# Patient Record
Sex: MALE | Race: WHITE | NOT HISPANIC OR LATINO | Employment: FULL TIME | URBAN - METROPOLITAN AREA
[De-identification: names, ages, dates, MRNs, and addresses within clinical notes are randomized per-mention and may not be internally consistent; named-entity substitution may affect disease eponyms.]

---

## 2019-05-24 ENCOUNTER — TRANSFERRED RECORDS (OUTPATIENT)
Dept: HEALTH INFORMATION MANAGEMENT | Facility: CLINIC | Age: 69
End: 2019-05-24

## 2019-05-31 ENCOUNTER — TRANSFERRED RECORDS (OUTPATIENT)
Dept: HEALTH INFORMATION MANAGEMENT | Facility: CLINIC | Age: 69
End: 2019-05-31

## 2019-07-18 ENCOUNTER — TRANSFERRED RECORDS (OUTPATIENT)
Dept: HEALTH INFORMATION MANAGEMENT | Facility: CLINIC | Age: 69
End: 2019-07-18

## 2019-07-29 ENCOUNTER — TRANSFERRED RECORDS (OUTPATIENT)
Dept: HEALTH INFORMATION MANAGEMENT | Facility: CLINIC | Age: 69
End: 2019-07-29

## 2019-08-26 ENCOUNTER — TRANSFERRED RECORDS (OUTPATIENT)
Dept: HEALTH INFORMATION MANAGEMENT | Facility: CLINIC | Age: 69
End: 2019-08-26

## 2019-10-02 NOTE — TELEPHONE ENCOUNTER
RECORDS STATUS - ALL OTHER DIAGNOSIS      RECORDS RECEIVED FROM: Epic/[facility]   DATE RECEIVED: 10/9/2019    NOTES STATUS DETAILS   OFFICE NOTE from referring provider     OFFICE NOTE from medical oncologist [facility]- Dr. Shruthi Santiago- Dr. Meeks   DISCHARGE SUMMARY from hospital     DISCHARGE REPORT from the ER     OPERATIVE REPORT     MEDICATION LIST     CLINICAL TRIAL TREATMENTS TO DATE     LABS     PATHOLOGY REPORTS [facility] bx slides arrived on 10/18/2019 at 11:15am and sent up to the 5th floor path lab at the [facility].  Reports are being faxed over from [facility]. Faxed will request slides from Dr. Pacheco.    ANYTHING RELATED TO DIAGNOSIS     GENONOMIC TESTING     TYPE:     IMAGING (NEED IMAGES & REPORT)     CT SCANS     MRI Complete- Resolved IMG on 10/2/2019 at 1:12pm from [facility].  Reports are being faxed from [facility]. IMG was done at [facility].     PACS   MAMMO     ULTRASOUND     PET       Action    Action Taken 10/2/2019 10:37am     Correction for appt : Pt has been seen by Dr. Meeks. The pt hasn't been to a [facility] facility.     I called the pt and he mentioned having a biopsy and MRI IMG done at [facility].     I called [facility] ph: [phone]. [facility] records in [place].     Dr. Hawkins is a Urologist at [facility]. [facility] is going to fax over records including path reports and IMG reports.     IMG was done at [facility].     Dr. Pacheco manages the pathology dept at [facility]. He requires an ZAC for the path slides. Fax path request to: [phone] Mailing Address: [address] [place] [address].     I called pt Carmelo back to request and ZAC for the path slides. He's going to fax the ZAC back today. His email address is: [email or web address]   I sent an email to pt Carmelo requesting a signed ZAC.     I sent an Ib message to Dr. Faith and his nurse with the records and path updates.     I called [facility] to request all IMG to be  pushed to PACS. [facility] [place], [place]  [phone]    1:08pm   I called pt Carmelo back to follow up on ZAC. He just arrived at his office and faxed the form over while on the phone. I will check for the ZAC in a few mins and send a request off to [facility] path.     Resolved MRI IMG in PACS.     2:35pm   Sent a faxed request, shipping label and ZAC to [facility] for path slides.     10/3/2019 11am   Called [facility]  [phone] to make sure the fax went through and that the path request was sent to the path department.     I left a vm for the MR Dept at [facility] requesting a call back.

## 2019-10-09 ENCOUNTER — ONCOLOGY VISIT (OUTPATIENT)
Dept: ONCOLOGY | Facility: CLINIC | Age: 69
End: 2019-10-09
Attending: UROLOGY
Payer: COMMERCIAL

## 2019-10-09 ENCOUNTER — PRE VISIT (OUTPATIENT)
Dept: ONCOLOGY | Facility: CLINIC | Age: 69
End: 2019-10-09

## 2019-10-09 VITALS
WEIGHT: 220 LBS | RESPIRATION RATE: 16 BRPM | DIASTOLIC BLOOD PRESSURE: 91 MMHG | OXYGEN SATURATION: 95 % | HEIGHT: 72 IN | BODY MASS INDEX: 29.8 KG/M2 | TEMPERATURE: 97 F | HEART RATE: 69 BPM | SYSTOLIC BLOOD PRESSURE: 146 MMHG

## 2019-10-09 DIAGNOSIS — C61 PROSTATE CANCER (H): Primary | ICD-10-CM

## 2019-10-09 PROBLEM — K21.9 HIATAL HERNIA WITH GERD: Status: ACTIVE | Noted: 2018-11-14

## 2019-10-09 PROBLEM — E78.00 PURE HYPERCHOLESTEROLEMIA: Status: ACTIVE | Noted: 2019-10-09

## 2019-10-09 PROBLEM — K44.9 HIATAL HERNIA WITH GERD: Status: ACTIVE | Noted: 2018-11-14

## 2019-10-09 PROCEDURE — 99204 OFFICE O/P NEW MOD 45 MIN: CPT | Performed by: UROLOGY

## 2019-10-09 PROCEDURE — G0463 HOSPITAL OUTPT CLINIC VISIT: HCPCS

## 2019-10-09 RX ORDER — OMEPRAZOLE 40 MG/1
40 CAPSULE, DELAYED RELEASE ORAL DAILY
COMMUNITY

## 2019-10-09 RX ORDER — LISINOPRIL 10 MG/1
10 TABLET ORAL DAILY
COMMUNITY

## 2019-10-09 ASSESSMENT — MIFFLIN-ST. JEOR: SCORE: 1787.97

## 2019-10-09 ASSESSMENT — PAIN SCALES - GENERAL: PAINLEVEL: NO PAIN (0)

## 2019-10-09 NOTE — PROGRESS NOTES
Chief Complaint:   Prostate Cancer         History of Present Illness:    Ghassan Lambert is a very pleasant 69 year old male who presents with a history of prostate cancer. He presented to Dr. Hawkins earlier this year with elevated PSA to 4.70, 16% free PSA. Normal exam per report. States his father had some sort of prostate surgery, unclear if cancer. MRI done and notable for PIRADS 3 lesion. Uronav biopsy completed 7/18/2019. Found to have 3 cores of [place] 3+3 prostate cancer <15% of cores from the right prostate. He has discussed this pathology with Dr. Hawkins and initially leaning toward surveillance. He presents today for a second opinion.     He otherwise reports no significant urinary symptoms. States erections are normal.         Past Medical History:   Hiatal hernia  HTN         Past Surgical History:   Alana  Robotic hernia repair  Deviated septum repair         Medications     Current Outpatient Medications   Medication     lisinopril (PRINIVIL/ZESTRIL) 10 MG tablet     omeprazole (PRILOSEC) 40 MG DR capsule     No current facility-administered medications for this visit.           Family History:   Father with prostatectomy         Social History:     Social History     Socioeconomic History     Marital status:      Spouse name: Not on file     Number of children: Not on file     Years of education: Not on file     Highest education level: Not on file   Occupational History     Not on file   Social Needs     Financial resource strain: Not on file     Food insecurity:     Worry: Not on file     Inability: Not on file     Transportation needs:     Medical: Not on file     Non-medical: Not on file   Tobacco Use     Smoking status: Former Smoker     Packs/day: 0.00     Smokeless tobacco: Never Used   Substance and Sexual Activity     Alcohol use: Not on file     Comment: Rarely     Drug use: Never     Sexual activity: Not on file   Lifestyle     Physical activity:     Days per week: Not on  "file     Minutes per session: Not on file     Stress: Not on file   Relationships     Social connections:     Talks on phone: Not on file     Gets together: Not on file     Attends Worship service: Not on file     Active member of club or organization: Not on file     Attends meetings of clubs or organizations: Not on file     Relationship status: Not on file     Intimate partner violence:     Fear of current or ex partner: Not on file     Emotionally abused: Not on file     Physically abused: Not on file     Forced sexual activity: Not on file   Other Topics Concern     Parent/sibling w/ CABG, MI or angioplasty before 65F 55M? Not Asked   Social History Narrative     Not on file     No current tobacco use  Still working - runs company         Allergies:   Ezetimibe and Pravastatin         Review of Systems:  From intake questionnaire     Skin: negative  Eyes: negative  Ears/Nose/Throat: negative  Respiratory: No shortness of breath, dyspnea on exertion, cough, or hemoptysis  Cardiovascular: No chest pain or palpitations  Gastrointestinal: negative; no nausea/vomiting, constipation or diarrhea  Genitourinary: as per HPI  Musculoskeletal: negative  Neurologic: negative  Psychiatric: negative  Hematologic/Lymphatic/Immunologic: negative  Endocrine: negative         Physical Exam:     Patient is a 69 year old  male   Vitals: Blood pressure (!) 146/91, pulse 69, temperature 97  F (36.1  C), temperature source Tympanic, resp. rate 16, height 1.816 m (5' 11.5\"), weight 99.8 kg (220 lb), SpO2 95 %.  Constitutional: Body mass index is 30.26 kg/m .  Alert, no acute distress, oriented, conversant  Eyes: no scleral icterus; extraocular muscles intact, moist conjunctivae  Neck: trachea midline, no thyromegaly  Ears/nose/mouth: throat/mouth:normal, good dentition  Respiratory: no respiratory distress, or pursed lip breathing  Cardiovascular: pulses strong and intact; no obvious jugular venous distension " present  Gastrointestinal: soft, nontender, no organomegaly or masses,   Lymphatics: No inguinal adenopathy  Musculoskeletal: extremities normal, no peripheral edema  Skin: no suspicious lesions or rashes  Neuro: Alert, oriented, speech and mentation normal  Psych: affect and mood normal, alert and oriented to person, place and time  Gait: Normal  : deferred      Imaging:    I reviewed all applicable imaging and went over findings with patient.    MRI Prostate 5/31/2019  PIRADS 3 lesion at right mid gland peripheral zone      Outside and Past Medical records:    I spent 10 minutes reviewing outside and past medical records.         Assessment and Plan:     Assessment: 69 year old male with Dile[place] 3+3 prostate cancer, PSA 4.7, PIRADS 3 lesion. We had an extensive discussion about the significance of localized, prostate cancer. We discussed the options for treatment of a localized prostate cancer including active surveillance, brachytherapy, external beam radiation therapy, and surgical removal.      We discussed the relative merits of robotic assisted radical prostatectomy. We also discussed the advantages and disadvantages and roles of open surgery vs. laparoscopic (and Da Oz assisted) surgery. The anticipated post-operative course was explained, including an anticipated 1-2 day hospital stay. Catheter will remain in place 10-14 days.  The risks, benefits, alternatives, and personnel involved in the procedure were discussed. Specifically, we discussed that risks include but are not limited to anesthetic complications including stroke, MI, DVT/PE, as well as risk of bleeding, bowel injury, infection, lymphocele, urine leak and other potentially unforseen complications. Also discussed the risk of bladder neck contracture and other urinary symptoms after procedure. In addition, we discussed risk of urinary incontinence and erectile dysfunction following the procedure. Finally, we discussed risk for adverse  pathologic features, including positive surgical margins and potential for post-surgical radiation, hormone ablation and long-term need for PSA monitoring,.  All questions were answered in detail.     We discussed that there was no clear evidence of advantage between surgery and radiation with regard to risk of recurrence. We discussed option for discussion with radiation oncology, but patient would prefer to defer this at this time.    The majority of our discussion related to active surveillance for prostate cancer. We discussed that this is a common treatment decision for men with very low and low risk prostate cancer and is endorsed by major urologic guidelines. We discussed the overall slow growth of most prostate cancers and the long history of data suggesting the safety of this approach. We discussed the ProtecT trial and that this randomized study demonstrated no differences in survival for men comparing active surveillance to radiation or surgery. We discussed that surveillance typically involves PSA rechecks every 6 months with intermittent repeat MRIs and repeat biopsies. We also discussed that approximately half of men eventually pursue active treatment while on surveillance.    As an adjunct to this, we discussed the role of genomic tests in low-risk prostate cancer as an additional tool to stratify risk. He is interested in this and would like to proceed with an Oncotype.    At this point, he would like to proceed with surveillance. I will arrange for a second opinion on path slides. Will plan to send Oncotype once we receive these. Barring unexpected findings, will plan to have him follow-up in 6 months for PSA and ROSALVA    Plan:  Obtain Path slides  Oncotype Dx  Follow-up 6 months for PSA and ROSALVA    Orders  Orders Placed This Encounter   Procedures     Consult outside slides: Laboratory Miscellaneous Order     Tim Faith MD  Urology  [facility]  Clinic Phone  [phone]

## 2019-10-09 NOTE — NURSING NOTE
"Oncology Rooming Note    October 9, 2019 3:20 PM   Ghassan Lambert is a 69 year old male who presents for:    Chief Complaint   Patient presents with     Oncology Clinic Visit     New Patient     Initial Vitals: BP (!) 146/91   Pulse 69   Temp 97  F (36.1  C) (Tympanic)   Resp 16   Ht 1.816 m (5' 11.5\")   Wt 99.8 kg (220 lb)   SpO2 95%   BMI 30.26 kg/m   Estimated body mass index is 30.26 kg/m  as calculated from the following:    Height as of this encounter: 1.816 m (5' 11.5\").    Weight as of this encounter: 99.8 kg (220 lb). Body surface area is 2.24 meters squared.  No Pain (0) Comment: Data Unavailable   No LMP for male patient.  Allergies reviewed: Yes  Medications reviewed: Yes    Medications: Medication refills not needed today.  Pharmacy name entered into [employer]: [id number] [place], [place] - [address] [address]    Clinical concerns: Follow Up       Valerie Bermudez CMA              "

## 2019-10-10 NOTE — PROGRESS NOTES
Contacted [facility] regarding consult for outside slides.   Filled out Surgical Pathology-Outside Case requisition form.   Carmelita directed this RNCC to contact [facility] to send slides to [facility] [place].   Writer left message with [facility] transferred to Medical Records with mutual Pt identifiers request to send slides to [facility] [place].   Pending return call.    LUCITA GordonN, RN, OCN  RN Cancer Care Coordinator  [facility]  [phone]

## 2019-10-11 DIAGNOSIS — C61 PROSTATE CANCER (H): Primary | ICD-10-CM

## 2019-10-11 NOTE — PROGRESS NOTES
S-(situation): Slides requested for consult at [facility] pathology on 10/02/2019. Second request 10/10/2019. Confirmed 10/11/2019 slides sent via [employer] per Medical Records at [facility] to the [facility].     B-(background): Prostate Cancer     A-(assessment): Plan for pathology consultation on slides. Dr. aFith requests an Oncotype Dx.    R-(recommendations): This RNCC will complete paperwork and fax for consultation and Oncotype Dx.    Elyssa Wagner, BSN, RN, OCN  RN Cancer Care Coordinator  [facility]  [phone]

## 2019-10-16 ENCOUNTER — PATIENT OUTREACH (OUTPATIENT)
Dept: ONCOLOGY | Facility: CLINIC | Age: 69
End: 2019-10-16

## 2019-10-16 NOTE — PROGRESS NOTES
S-(situation): Contacted [facility] for 3rd request for biopsy tissue material to be sent to [facility] for consult. Transferred to Evelin whom called the Pathologist directly to inquire about pathology slides. Evelin's direct contact is [phone].    B-(background): Prostate Cancer    A-(assessment): [facility] states slides are usually sent overnight. Evelin is looking into [employer] number to determine location of slides. No documentation noted that slides were sent out.     Returned call to Evelin at [facility], left detailed message with contact information to please send slides to:  Pathology [address] [id number] [address]  [place] [address].     R-(recommendations): Will follow-up on 10/18/2019. Colleague Citlaly Talley RN contacted [facility] Pathology confirming receipt of biopsy tissue. Consult in process.    Request placed for Oncotype DX on /10/24/2019. Electronic requisition sent by Sara Pyle.     This RNCC will monitor chart and communicate with Dr. Faith when results are final.    Elyssa Wagner, BSN, RN, OCN  RN Cancer Care Coordinator  [facility]  [phone]

## 2019-10-18 ENCOUNTER — TELEPHONE (OUTPATIENT)
Dept: UROLOGY | Facility: CLINIC | Age: 69
End: 2019-10-18

## 2019-10-18 NOTE — TELEPHONE ENCOUNTER
[facility] Bx Slides Arrived on 10/18/2019 and sent up to the 5th floor path lab to be processed.

## 2019-10-22 LAB — COPATH REPORT: NORMAL

## 2019-10-22 PROCEDURE — 00000346 ZZHCL STATISTIC REVIEW OUTSIDE SLIDES TC 88321: Performed by: UROLOGY

## 2019-10-30 ENCOUNTER — HEALTH MAINTENANCE LETTER (OUTPATIENT)
Age: 69
End: 2019-10-30

## 2019-12-15 ENCOUNTER — HEALTH MAINTENANCE LETTER (OUTPATIENT)
Age: 69
End: 2019-12-15

## 2020-06-05 ENCOUNTER — VIRTUAL VISIT (OUTPATIENT)
Dept: UROLOGY | Facility: CLINIC | Age: 70
End: 2020-06-05
Payer: COMMERCIAL

## 2020-06-05 VITALS — WEIGHT: 215 LBS | HEIGHT: 72 IN | BODY MASS INDEX: 29.12 KG/M2

## 2020-06-05 DIAGNOSIS — C61 PROSTATE CANCER (H): Primary | ICD-10-CM

## 2020-06-05 PROCEDURE — 99213 OFFICE O/P EST LOW 20 MIN: CPT | Mod: 95 | Performed by: UROLOGY

## 2020-06-05 RX ORDER — ALLOPURINOL 300 MG/1
300 TABLET ORAL DAILY
COMMUNITY

## 2020-06-05 ASSESSMENT — PAIN SCALES - GENERAL: PAINLEVEL: NO PAIN (0)

## 2020-06-05 ASSESSMENT — MIFFLIN-ST. JEOR: SCORE: 1765.29

## 2020-06-05 NOTE — NURSING NOTE
Chief Complaint   Patient presents with     Clinic Care Coordination - Follow-up      discuss PSA   Ledy Nieto LPN

## 2020-06-05 NOTE — PROGRESS NOTES
"Ghassan Lambert is a 69 year old male who is being evaluated via a billable telephone visit.      The patient has been notified of following:     \"This telephone visit will be conducted via a call between you and your physician/provider. We have found that certain health care needs can be provided without the need for a physical exam.  This service lets us provide the care you need with a short phone conversation.  If a prescription is necessary we can send it directly to your pharmacy.  If lab work is needed we can place an order for that and you can then stop by our lab to have the test done at a later time.    Telephone visits are billed at different rates depending on your insurance coverage. During this emergency period, for some insurers they may be billed the same as an in-person visit.  Please reach out to your insurance provider with any questions.    If during the course of the call the physician/provider feels a telephone visit is not appropriate, you will not be charged for this service.\"    Patient has given verbal consent for Telephone visit?  Yes    What phone number would you like to be contacted at? [phone]    How would you like to obtain your AVS? MyChart    Chief Complaint   It was my pleasure to speak with Ghassan Lambert who is a 69 year old male for follow-up of Prostate Cancer.      HPI  Ghassan Lambert is a very pleasant 69 year old male who presents with a history of prostate cancer. He presented to Dr. Hawkins earlier this year with elevated PSA to 4.70, 16% free PSA. Normal exam per report. States his father had some sort of prostate surgery, unclear if cancer. MRI done and notable for PIRADS 3 lesion. Uronav biopsy completed 7/18/2019.     He otherwise reports no significant urinary symptoms. States erections are normal. PSA is stable. No major health changes.    PSA  5/29/2020 - 3.03  5/17/2019 - 4.70    FINAL DIAGNOSIS:   [place] [id number] " B, OBTAINED   07/18/2019):   RAGHAVENDRA. Prostate, right, biopsy:   - Prostatic adenocarcinoma, acinar type, [place] score 6 (3+3)   - Dileep grade 1   - Involves 3 of 6 cores, approximately 10% of total tissue     GPS 43     MRI Prostate 5/31/2019  PIRADS 3 lesion at right mid gland peripheral zone    I have reviewed and updated the patient's Past Medical History, Social History, Family History and Medication List.    ALLERGIES  Ezetimibe and Pravastatin    ASSESSMENT and PLAN  69 year old male with [place] 3+3 prostate cancer, PSA down to 3.3, from 4.7, PIRADS 3 lesion on MRI over 1 year ago. We reviewed our prevoius discussion about prostate cancer and low risk disease. We discussed active surveillance and the rationale for ongoing PSA monitoring, and confirmatory re-biopsy. At this point, will continue surveillance with PSA in 6 months. Will also plan for repeat MRI at that time and consider another biopsy pending these findings.    - Follow-up 6 months with PSA and MRI prostate    Phone call duration: 12 minutes    Tim Faith MD  Urology  [facility]

## 2020-12-07 ENCOUNTER — TRANSFERRED RECORDS (OUTPATIENT)
Dept: HEALTH INFORMATION MANAGEMENT | Facility: CLINIC | Age: 70
End: 2020-12-07

## 2021-01-15 ENCOUNTER — HEALTH MAINTENANCE LETTER (OUTPATIENT)
Age: 71
End: 2021-01-15

## 2021-03-09 ENCOUNTER — ANCILLARY PROCEDURE (OUTPATIENT)
Dept: MRI IMAGING | Facility: CLINIC | Age: 71
End: 2021-03-09
Attending: UROLOGY
Payer: COMMERCIAL

## 2021-03-09 DIAGNOSIS — C61 PROSTATE CANCER (H): ICD-10-CM

## 2021-03-09 PROCEDURE — A9585 GADOBUTROL INJECTION: HCPCS | Performed by: RADIOLOGY

## 2021-03-09 PROCEDURE — 72197 MRI PELVIS W/O & W/DYE: CPT | Mod: GC | Performed by: RADIOLOGY

## 2021-03-09 RX ORDER — GADOBUTROL 604.72 MG/ML
10 INJECTION INTRAVENOUS ONCE
Status: COMPLETED | OUTPATIENT
Start: 2021-03-09 | End: 2021-03-09

## 2021-03-09 RX ADMIN — GADOBUTROL 10 ML: 604.72 INJECTION INTRAVENOUS at 15:06

## 2021-03-12 ENCOUNTER — OFFICE VISIT (OUTPATIENT)
Dept: UROLOGY | Facility: CLINIC | Age: 71
End: 2021-03-12
Payer: COMMERCIAL

## 2021-03-12 VITALS
SYSTOLIC BLOOD PRESSURE: 130 MMHG | WEIGHT: 216 LBS | HEART RATE: 91 BPM | BODY MASS INDEX: 29.26 KG/M2 | OXYGEN SATURATION: 96 % | HEIGHT: 72 IN | DIASTOLIC BLOOD PRESSURE: 88 MMHG

## 2021-03-12 DIAGNOSIS — C61 PROSTATE CANCER (H): Primary | ICD-10-CM

## 2021-03-12 PROCEDURE — 99213 OFFICE O/P EST LOW 20 MIN: CPT | Performed by: UROLOGY

## 2021-03-12 ASSESSMENT — MIFFLIN-ST. JEOR: SCORE: 1764.83

## 2021-03-12 ASSESSMENT — PAIN SCALES - GENERAL: PAINLEVEL: NO PAIN (0)

## 2021-03-12 NOTE — NURSING NOTE
Chief Complaint   Patient presents with     Follow up PSA and MRI   Patient denies having any issues with voiding.  Ledy Nieto LPN

## 2021-03-12 NOTE — PROGRESS NOTES
CHIEF COMPLAINT   It was my pleasure to see Ghassan Lambert who is a 70 year old male for follow-up of Prostate Cancer.    HPI  Ghassan Lambert is a very pleasant 70 year old male who presents with a history of prostate cancer. He presented to Dr. Hawkins in 2019 with elevated PSA to 4.70, 16% free PSA. Normal exam per report. States his father had some sort of prostate surgery, unclear if cancer. MRI done and notable for PIRADS 3 lesion. Uronav biopsy completed 7/18/2019, with finding of small volume [place] 3+3=6 prostate cancer.   He remains on active surveillance. PSA was stable at 3.64. Here to review MRI, which demonstrates PIRADS 3 lesion.    PSA  12/2020 - 3.64  5/29/2020 - 3.03  5/17/2019 - 4.70     FINAL DIAGNOSIS:   [facility], [place], [place] ([id number] B, OBTAINED   07/18/2019):   B. Prostate, right, biopsy:   - Prostatic adenocarcinoma, acinar type, [place] score 6 (3+3)   - Dileep grade 1   - Involves 3 of 6 cores, approximately 10% of total tissue      GPS 43     MRI Prostate 3/9/2021  IMPRESSION:  1. Based on the most suspicious abnormality, this exam is  characterized as PIRADS 3 - The presence of clinically significant  cancer is equivocal.  The most suspicious abnormality is located at  the bilateral base peripheral zone at the 6-7 o'clock position  relative to the urethra and there is short segment capsular abutment  with low likelihood of minimal extraprostatic extension.  1a. Noting that previously noted indeterminate right lateral mid gland  lesion is no longer identified and therefore likely represents focal  prostatitis at that time, it is possible that this particular focus  also represents a focal inflammatory process as opposed to malignancy.  1b. The focus mildly abuts the right seminal vesicle without definite  invasion.  2. No suspicious adenopathy or evidence of pelvic metastases.     He otherwise reports no significant urinary symptoms. States erections are  "normal. PSA is stable. No major health changes.    PHYSICAL EXAM  Patient is a 70 year old  male   Vitals: Blood pressure 130/88, pulse 91, height 1.816 m (5' 11.5\"), weight 98 kg (216 lb), SpO2 96 %.  General Appearance Adult: Body mass index is 29.71 kg/m .  Alert, no acute distress, oriented  Lungs: no respiratory distress, or pursed lip breathing  Abdomen: soft, nontender, no organomegaly or masses  Back: no CVAT  Neuro: Alert, oriented, speech and mentation normal  Psych: affect and mood normal    Outside and Past Medical records:  Review of prior notes with in Epic  Review of the result(s) of each unique test - PSA, MRI, pathology    ASSESSMENT and PLAN  70 year old male with [place] 3+3 prostate cancer, PSA down to 3.3, from 4.7, PIRADS 3 lesion on MRI over 1 year ago. We reviewed our previous discussion about prostate cancer and low risk disease. We discussed active surveillance and the rationale for ongoing PSA monitoring, and confirmatory re-biopsy. His MRI does not demonstrate particularly suspicious areas and his PSA remains very low. While we did discuss the role for a confirmatory re-biopsy, it is reasonable to continue surveillance for now. Plan PSA in 6 months and consider another biopsy pending these findings.     - Follow-up 6 months with PSA    Greater than 20 minutes spent on the date of the encounter doing chart review, history and exam, documentation and further activities as noted above.    Tim Faith MD  Urology  [facility]    "

## 2021-05-26 ENCOUNTER — RECORDS - HEALTHEAST (OUTPATIENT)
Dept: ADMINISTRATIVE | Facility: CLINIC | Age: 71
End: 2021-05-26

## 2021-06-25 ENCOUNTER — TRANSFERRED RECORDS (OUTPATIENT)
Dept: HEALTH INFORMATION MANAGEMENT | Facility: CLINIC | Age: 71
End: 2021-06-25

## 2021-09-04 ENCOUNTER — HEALTH MAINTENANCE LETTER (OUTPATIENT)
Age: 71
End: 2021-09-04

## 2021-11-08 ENCOUNTER — TRANSFERRED RECORDS (OUTPATIENT)
Dept: HEALTH INFORMATION MANAGEMENT | Facility: CLINIC | Age: 71
End: 2021-11-08
Payer: COMMERCIAL

## 2021-11-11 ENCOUNTER — VIRTUAL VISIT (OUTPATIENT)
Dept: UROLOGY | Facility: CLINIC | Age: 71
End: 2021-11-11
Payer: COMMERCIAL

## 2021-11-11 VITALS — WEIGHT: 222 LBS | BODY MASS INDEX: 30.07 KG/M2 | HEIGHT: 72 IN

## 2021-11-11 DIAGNOSIS — C61 PROSTATE CANCER (H): Primary | ICD-10-CM

## 2021-11-11 PROCEDURE — 99213 OFFICE O/P EST LOW 20 MIN: CPT | Mod: 95 | Performed by: UROLOGY

## 2021-11-11 RX ORDER — SIMVASTATIN 20 MG
20 TABLET ORAL DAILY
COMMUNITY

## 2021-11-11 ASSESSMENT — PAIN SCALES - GENERAL: PAINLEVEL: MILD PAIN (2)

## 2021-11-11 ASSESSMENT — MIFFLIN-ST. JEOR: SCORE: 1791.02

## 2021-11-11 NOTE — PROGRESS NOTES
Ghassan Lambert is a 71 year old male who is being evaluated via a billable video visit.      How would you like to obtain your AVS? MyChart  If the video visit is dropped, the invitation should be resent by: Text to cell phone: [phone]  Will anyone else be joining your video visit? No    Video Start Time: 11:17 AM    CHIEF COMPLAINT  It was my pleasure to see Ghassan Lambert who is a 71 year old male for follow-up of Prostate Cancer.    HPI  Ghassan Lambert is a very pleasant 71 year old male who presents with a history of prostate cancer. He presented to Dr. Hawkins in 2019 with elevated PSA to 4.70, 16% free PSA. Normal exam per report. States his father had some sort of prostate surgery, unclear if cancer. MRI done and notable for PIRADS 3 lesion. Uronav biopsy completed 7/18/2019, with finding of small volume [place] 3+3=6 prostate cancer.     He remains on active surveillance. PSA was stable at 3.64. Here to review MRI, which demonstrates PIRADS 3 lesion.     PSA  11/9/2021 - 3.37  6/25/2021 - 4.17  12/2020 - 3.64  5/29/2020 - 3.03  5/17/2019 - 4.70     FINAL DIAGNOSIS:   [id number], OBTAINED   07/18/2019):   B. Prostate, right, biopsy:   - Prostatic adenocarcinoma, acinar type, [place] score 6 (3+3)   - [place] grade 1   - Involves 3 of 6 cores, approximately 10% of total tissue      [facility] 43     MRI Prostate 3/9/2021  IMPRESSION:  1. Based on the most suspicious abnormality, this exam is  characterized as PIRADS 3 - The presence of clinically significant  cancer is equivocal.  The most suspicious abnormality is located at  the bilateral base peripheral zone at the 6-7 o'clock position  relative to the urethra and there is short segment capsular abutment  with low likelihood of minimal extraprostatic extension.  1a. Noting that previously noted indeterminate right lateral mid gland  lesion is no longer identified and therefore likely represents  focal  prostatitis at that time, it is possible that this particular focus  also represents a focal inflammatory process as opposed to malignancy.  1b. The focus mildly abuts the right seminal vesicle without definite  invasion.  2. No suspicious adenopathy or evidence of pelvic metastases.         Allergies:   Ezetimibe and Pravastatin         Review of Systems:  From intake questionnaire     Skin: negative  Eyes: negative  Ears/Nose/Throat: negative  Respiratory: No shortness of breath, dyspnea on exertion, cough, or hemoptysis  Cardiovascular: No chest pain or palpitations  Gastrointestinal: negative; no nausea/vomiting, constipation or diarrhea  Genitourinary: as per HPI  Musculoskeletal: negative  Neurologic: negative  Psychiatric: negative  Hematologic/Lymphatic/Immunologic: negative  Endocrine: negative         Physical Exam:     Vitals:  No vitals were obtained today due to virtual visit.    Physical Exam   GENERAL: Healthy, alert and no distress  EYES: Eyes grossly normal to inspection.  No discharge or erythema, or obvious scleral/conjunctival abnormalities.  RESP: No audible wheeze, cough, or visible cyanosis.  No visible retractions or increased work of breathing.    SKIN: Visible skin clear. No significant rash, abnormal pigmentation or lesions.  NEURO: Cranial nerves grossly intact.  Mentation and speech appropriate for age.  PSYCH: Mentation appears normal, affect normal/bright, judgement and insight intact, normal speech and appearance well-groomed.      Outside and Past Medical records:    Review of the result(s) of each unique test - PSA, MRI         Assessment and Plan:     71 year old male with Dileep 3+3 prostate cancer, PSA down to 3.37, from 4.7 (at time of diagnosis), PIRADS 3 lesion on MRI. We reviewed our previous discussion about prostate cancer and low risk disease. We discussed active surveillance and the rationale for ongoing PSA monitoring, and confirmatory re-biopsy. His MRI does not  demonstrate particularly suspicious areas and his PSA remains very low. While we did discuss the role for a confirmatory re-biopsy, it is reasonable to continue surveillance for now. Plan PSA in 6 months and consider another biopsy pending these findings.     - Follow-up 6 months with PSA    Orders  Orders Placed This Encounter   Procedures     PSA tumor marker [[id number]]     Video-Visit Details    Type of service:  Video Visit    Video End Time:11:37 AM    Originating Location (pt. Location): Home    Distant Location (provider location):  [facility] [place]     Platform used for Video Visit: [employer]    21 minutes spent on the date of the encounter including direct interaction with the patient, performing chart review, history and exam, documentation and further activities as noted above.    Tim Faith MD  Urology  [facility]

## 2022-02-19 ENCOUNTER — HEALTH MAINTENANCE LETTER (OUTPATIENT)
Age: 72
End: 2022-02-19

## 2022-05-19 ENCOUNTER — OFFICE VISIT (OUTPATIENT)
Dept: UROLOGY | Facility: CLINIC | Age: 72
End: 2022-05-19
Payer: COMMERCIAL

## 2022-05-19 VITALS
DIASTOLIC BLOOD PRESSURE: 80 MMHG | SYSTOLIC BLOOD PRESSURE: 130 MMHG | BODY MASS INDEX: 30.38 KG/M2 | HEIGHT: 71 IN | WEIGHT: 217 LBS

## 2022-05-19 DIAGNOSIS — C61 PROSTATE CANCER (H): Primary | ICD-10-CM

## 2022-05-19 LAB — PSA SERPL-MCNC: 7.8 UG/L (ref 0–4)

## 2022-05-19 PROCEDURE — 99213 OFFICE O/P EST LOW 20 MIN: CPT | Performed by: UROLOGY

## 2022-05-19 PROCEDURE — 36415 COLL VENOUS BLD VENIPUNCTURE: CPT | Performed by: UROLOGY

## 2022-05-19 PROCEDURE — 84153 ASSAY OF PSA TOTAL: CPT | Performed by: UROLOGY

## 2022-05-19 RX ORDER — CIPROFLOXACIN 500 MG/1
500 TABLET, FILM COATED ORAL 2 TIMES DAILY
Qty: 6 TABLET | Refills: 0 | Status: SHIPPED | OUTPATIENT
Start: 2022-05-19 | End: 2022-05-22

## 2022-05-19 ASSESSMENT — PAIN SCALES - GENERAL: PAINLEVEL: NO PAIN (0)

## 2022-05-19 NOTE — PROGRESS NOTES
CHIEF COMPLAINT   It was my pleasure to see Ghassan Lambert who is a 71 year old male for follow-up of Prostate Cancer.      HPI  Ghassan Lambert is a very pleasant 71 year old male who presents with a history of prostate cancer. He presented to Dr. Hawkins in 2019 with elevated PSA to 4.70, 16% free PSA. Normal exam per report. States his father had some sort of prostate surgery, unclear if cancer. MRI done and notable for PIRADS 3 lesion. Uronav biopsy completed 7/18/2019, with finding of small volume Dileep 3+3=6 prostate cancer.      He remains on active surveillance. PSA has now risen to 7.80.      PSA  5/19/2022 - 7.80  11/9/2021 - 3.37  6/25/2021 - 4.17  12/2020 - 3.64  5/29/2020 - 3.03  5/17/2019 - 4.70     FINAL DIAGNOSIS:   [id number] B, OBTAINED   07/18/2019):   B. Prostate, right, biopsy:   - Prostatic adenocarcinoma, acinar type, Dileep score 6 (3+3)   - Dileep grade 1   - Involves 3 of 6 cores, approximately 10% of total tissue      [facility] 43     MRI Prostate 3/9/2021  IMPRESSION:  1. Based on the most suspicious abnormality, this exam is  characterized as PIRADS 3 - The presence of clinically significant  cancer is equivocal.  The most suspicious abnormality is located at  the bilateral base peripheral zone at the 6-7 o'clock position  relative to the urethra and there is short segment capsular abutment  with low likelihood of minimal extraprostatic extension.  1a. Noting that previously noted indeterminate right lateral mid gland  lesion is no longer identified and therefore likely represents focal  prostatitis at that time, it is possible that this particular focus  also represents a focal inflammatory process as opposed to malignancy.  1b. The focus mildly abuts the right seminal vesicle without definite  invasion.  2. No suspicious adenopathy or evidence of pelvic metastases.    PHYSICAL EXAM  Patient is a 71 year old  male   Vitals: Blood pressure  "130/80, height 1.803 m (5' 11\"), weight 98.4 kg (217 lb).  General Appearance Adult: Body mass index is 30.27 kg/m .  Alert, no acute distress, oriented  Lungs: no respiratory distress, or pursed lip breathing  Abdomen: soft, nontender, no organomegaly or masses  Back: no CVAT  Neuro: Alert, oriented, speech and mentation normal  Psych: affect and mood normal    Outside and Past Medical records:  Review of the result(s) of each unique test - PSA    ASSESSMENT and PLAN  71 year old male with [place] 3+3 prostate cancer, PSA now up to 7.80.  PSA was 4.7 (at time of diagnosis), PIRADS 3 lesion on MRI. We reviewed our previous discussion about prostate cancer and low risk disease. Given his PSA has risen, would be prudent to complete updated MRI and confirmatory re-biopsy using MRI fusion. Risks of this were discussed and he elects to proceed.    - Schedule MRI-guided fusion TRUS biopsy    25 minutes spent on the date of the encounter doing chart review, history and exam, documentation and further activities as noted above.    Tim Faith MD  Urology  [facility]    "

## 2022-05-19 NOTE — PATIENT INSTRUCTIONS
Ultrasound Guided Prostate Biopsy    What is a prostate biopsy? A prostate biopsy is a relatively painless procedure performed in the physician's office, outpatient facility or hospital.  A long, thin needle is inserted into the prostate to collect a sample of tissue from the prostate.  These samples are then examined by a pathologist for abnormal cells.    Why do I need a prostate biopsy? During a man's lifetime the prostate gradually increases in size.  The patient may or may not experience symptoms.  Symptoms of an enlarged prostate include:    Increased frequency of urination    Decreased force of urinary stream    Trouble with urination    Awakening at night to urinate    When the prostate is examined, the physician may feel a nodule (hard or firm growth) which would require a biopsy.    Another reason for having a prostate biopsy is an increase in the prostate specific androgen (PSA) in your blood.  A prostate biopsy may be necessary to determine the cause of the increased PSA.    Your physician will also perform an ultrasound (an image created by sound waves).  The ultrasound is performed by placing a small probe in the rectum to image the prostate gland.    Preparation for the Prostate Biopsy    Blood thinning medications must be stopped prior to your biopsy.  Please stop the following medication the appropriate number of days before:  10 days-acetylsalicylic acid, vitamin E, fish oil, aspirin, baby aspirin  7 days- Plavix, Brilinta, ibuprofen (Advil, Motrin, Aleve)  5 days-Pradaxa  4 days-Coumadin/warfarin  3 days-Eliquis, Xarelto    2.  If you have any bleeding problems (thin blood), please tell your doctor.    3.  If you have been told by another doctor to take antibiotics before dental work or surgery, please tell the doctor.  This is common for patients that have had a joint replacement.    YOU HAVE BEEN PRESCRIBED AN ANTIBIOTIC.  You will start this medication the day before your biopsy. It is one  pill, twice a day.  You will continue taking the medication until it is gone.    The day of the biopsy you will be asked to use an enema one hour before you leave for your appointment.    PLEASE EAT YOUR REGULAR MEALS ON THE DAY OF YOUR BIOPSY.    Unless you have been prescribed a sedative (Valium or a similar drug) you will be able to drive to and from your appointment.  If you have taken a sedative you must have a ride.    AFTER THE BIOPSY    DANGER SIGNS    Small amount of blood in the urine for 10-14 days Excessive blood in the urine, stool or ejaculate  Small amount of blood in the stool for 48 hours Fever over 100 or chills  Small amount of blood in the ejaculate for up to Frequent urination or burning when you urinate  4 weeks          CALL THE DOCTOR'S OFFICE -[phone] IF YOU HAVE ANY OF THESE SYMPTOMS.  IF YOU CANNOT CONTACT THE OFFICE, GO TO THE EMERGENCY ROOM.          Your biopsy is scheduled on Thursday, 6/16/22, at our [place] office.  Please be at the office at 3:00PM.    A follow up visit has been scheduled for you on Thursday, 6/23/22, at 4:45PM.  This is a virtual visit.  Your doctor will discuss your results with you at this visit.

## 2022-05-20 ENCOUNTER — ANCILLARY PROCEDURE (OUTPATIENT)
Dept: MRI IMAGING | Facility: CLINIC | Age: 72
End: 2022-05-20
Attending: UROLOGY
Payer: COMMERCIAL

## 2022-05-20 DIAGNOSIS — C61 PROSTATE CANCER (H): ICD-10-CM

## 2022-05-20 PROCEDURE — 72197 MRI PELVIS W/O & W/DYE: CPT | Performed by: RADIOLOGY

## 2022-05-20 PROCEDURE — A9585 GADOBUTROL INJECTION: HCPCS | Performed by: RADIOLOGY

## 2022-05-20 RX ORDER — GADOBUTROL 604.72 MG/ML
10 INJECTION INTRAVENOUS ONCE
Status: COMPLETED | OUTPATIENT
Start: 2022-05-20 | End: 2022-05-20

## 2022-05-20 RX ADMIN — GADOBUTROL 9.5 ML: 604.72 INJECTION INTRAVENOUS at 11:54

## 2022-05-20 NOTE — DISCHARGE INSTRUCTIONS
MRI Contrast Discharge Instructions    The IV contrast you received today will pass out of your body in your  urine. This will happen in the next 24 hours. You will not feel this process.  Your urine will not change color.    Drink at least 4 extra glasses of water or juice today (unless your doctor  has restricted your fluids). This reduces the stress on your kidneys.  You may take your regular medicines.    If you are on dialysis: It is best to have dialysis today.    If you have a reaction: Most reactions happen right away. If you have  any new symptoms after leaving the hospital (such as hives or swelling),  call your hospital at the correct number below. Or call your family doctor.  If you have breathing distress or wheezing, call 911.    Special instructions: ***    I have read and understand the above information.    Signature:______________________________________ Date:___________    Staff:__________________________________________ Date:___________     Time:__________    [place] [facility] Departments:    ___[place]: [phone]  ___[facility]: [phone]  ___[place]: [phone] ___[facility]: [phone]  ___[facility]: [phone]  ___[place] [facility]: [phone]  ___[phone]  ___[facility]: [phone]  ___[phone]

## 2022-06-01 LAB — RADIOLOGIST FLAGS: ABNORMAL

## 2022-06-02 ENCOUNTER — OFFICE VISIT (OUTPATIENT)
Dept: UROLOGY | Facility: CLINIC | Age: 72
End: 2022-06-02
Payer: COMMERCIAL

## 2022-06-02 VITALS
WEIGHT: 217 LBS | OXYGEN SATURATION: 96 % | DIASTOLIC BLOOD PRESSURE: 82 MMHG | BODY MASS INDEX: 30.38 KG/M2 | SYSTOLIC BLOOD PRESSURE: 133 MMHG | HEIGHT: 71 IN | HEART RATE: 81 BPM

## 2022-06-02 DIAGNOSIS — C61 PROSTATE CANCER (H): Primary | ICD-10-CM

## 2022-06-02 PROCEDURE — 96372 THER/PROPH/DIAG INJ SC/IM: CPT | Mod: 59 | Performed by: UROLOGY

## 2022-06-02 PROCEDURE — 88342 IMHCHEM/IMCYTCHM 1ST ANTB: CPT | Performed by: PATHOLOGY

## 2022-06-02 PROCEDURE — 88305 TISSUE EXAM BY PATHOLOGIST: CPT | Performed by: PATHOLOGY

## 2022-06-02 PROCEDURE — 55700 PR BIOPSY OF PROSTATE,NEEDLE/PUNCH: CPT | Performed by: UROLOGY

## 2022-06-02 PROCEDURE — 88341 IMHCHEM/IMCYTCHM EA ADD ANTB: CPT | Performed by: PATHOLOGY

## 2022-06-02 PROCEDURE — 76942 ECHO GUIDE FOR BIOPSY: CPT | Performed by: UROLOGY

## 2022-06-02 RX ORDER — GENTAMICIN 40 MG/ML
80 INJECTION, SOLUTION INTRAMUSCULAR; INTRAVENOUS ONCE
Status: COMPLETED | OUTPATIENT
Start: 2022-06-02 | End: 2022-06-02

## 2022-06-02 RX ORDER — TOBRAMYCIN 3 MG/ML
SOLUTION/ DROPS OPHTHALMIC
COMMUNITY
Start: 2022-05-24 | End: 2022-07-21

## 2022-06-02 RX ORDER — LIDOCAINE HYDROCHLORIDE 10 MG/ML
20 INJECTION, SOLUTION EPIDURAL; INFILTRATION; INTRACAUDAL; PERINEURAL ONCE
Status: COMPLETED | OUTPATIENT
Start: 2022-06-02 | End: 2022-06-02

## 2022-06-02 RX ORDER — CIPROFLOXACIN 500 MG/1
TABLET, FILM COATED ORAL
COMMUNITY
Start: 2022-05-24 | End: 2022-06-20

## 2022-06-02 RX ADMIN — LIDOCAINE HYDROCHLORIDE 10 ML: 10 INJECTION, SOLUTION EPIDURAL; INFILTRATION; INTRACAUDAL; PERINEURAL at 09:00

## 2022-06-02 RX ADMIN — GENTAMICIN 80 MG: 40 INJECTION, SOLUTION INTRAMUSCULAR; INTRAVENOUS at 08:10

## 2022-06-02 ASSESSMENT — PAIN SCALES - GENERAL: PAINLEVEL: NO PAIN (0)

## 2022-06-02 NOTE — NURSING NOTE
Chief Complaint   Patient presents with     Elevated PSA     Patient is here for Transrectal Ultrasound/MRI Guided Prostate Biopsies      Procedure was explained to patient prior to performing said procedure.  The patient signed the [place] consent form and all questions were answered prior to the procedure.  Any pre-procedural antibiotics were given according to the performing physician's recommendation.  Patient reviewed information on the labels attached to samples and confirmed the accuracy of all of the labels.     Performing Physician:  Dr. Faith  Antibiotic taken?  yes  Aspirin or other blood thinning medications discontinued 7-10 days:  yes  Time of enema: 6:15am    Patient given Gentamicin intramuscular injection 30 minutes prior to procedure  Yes  The following medication was given:     MEDICATION: Gentamicin   ROUTE: IM  SITE: LUQ - Gluteus  DOSE:80mg/2ml  LOT #: [id number]  : [employer]  EXPIRATION DATE:03/23   NDC#: 58211-982-89   Was there drug waste? No  Multi-dose vial: Yes    Using a 1 1/2 inch 21 guage needle medication drawn up as ordered with sterile syringe. Using sterile technique, a new 1 1/2 needle 21 gauge placed on syringe and patient cleansed with alcohol pad. Site was mapped out using palm of hand on the greater trochanter and forefinger on iliac crest. Using V technique between middle finger and index finger. Skin was tracted and Injection was given using a 90 degree angle dart method and after aspiration of needle and no blood, medication was slowly given via IM injection. After 10 seconds needle was removed.  Patient tolerated injection well, and bandage placed on site following insertion removal.       Devi Websetr LPN

## 2022-06-02 NOTE — PATIENT INSTRUCTIONS
[facility]  Transrectal Ultrasound  Post Operative Information    The physician who performed your Transrectal Ultrasound is Dr. Faith (telephone number [phone]).  Please contact this doctor if you have any problems or questions.  If unable to reach your doctor, please return to the Emergency Department.    Take one antibiotic the evening of the procedure and then as directed on your prescription.  Drink at least 6-8 glasses of fluids for the first 48 hours.  Avoid heavy lifting and strenuous activity for 48 hours.  Avoid sexual intercourse for the first 24 hours.  No aspirin or ibuprofen products (Motrin, Advil, Nuprin, ect.) for one week.  You may take acetaminophen (Tylenol) for pain.  You may notice a small amount of blood on the tissue after a bowel movement.  You may pass blood with clots in your urine following the procedure.  The amount will decrease with time but may be visible for up to two weeks.   You make have blood in your semen for 4 weeks after the procedure.  You may experience mild perineal (groin area) discomfort after the procedure.  Please call you doctor if you have any of the follow symptoms:  Fever  Increase in the amount of blood passed  Severe discomfort or pain

## 2022-06-02 NOTE — NURSING NOTE
"Chief Complaint   Patient presents with     Elevated PSA     Patient is here for Transrectal Ultrasound/MRI Guided Prostate Biopsies        Blood pressure (!) 149/90, pulse 77, height 1.803 m (5' 11\"), weight 98.4 kg (217 lb), SpO2 94 %. Body mass index is 30.27 kg/m .    Patient Active Problem List   Diagnosis     Prostate cancer (H)     Essential hypertension     Hiatal hernia with GERD     Tobacco abuse     Pure hypercholesterolemia       Allergies   Allergen Reactions     Ezetimibe Difficulty breathing and Shortness Of Breath     Pravastatin Difficulty breathing and Shortness Of Breath       Current Outpatient Medications   Medication Sig Dispense Refill     allopurinol (ZYLOPRIM) 300 MG tablet        ciprofloxacin (CIPRO) 500 MG tablet TAKE 1 TABLET (500 MG) BY MOUTH 2 TIMES DAILY FOR 3 DAYS STARTING MORNING PRIOR TO DAY OF PROCEDURE, DAY OF AND DAY AFTER       lisinopril (PRINIVIL/ZESTRIL) 10 MG tablet Take 10 mg by mouth every 24 hours       omeprazole (PRILOSEC) 40 MG DR capsule Take 40 mg by mouth every 24 hours       simvastatin (ZOCOR) 20 MG tablet Take 20 mg by mouth daily       tobramycin (TOBREX) 0.3 % ophthalmic solution Instill 2 drops into both eyes every 4 hours until symptoms have resolved         Social History     Tobacco Use     Smoking status: Former Smoker     Packs/day: 0.00     Smokeless tobacco: Never Used   Substance Use Topics     Drug use: Never       Procedure was explained to patient prior to performing said procedure. The patient signed the consent form and all questions were answered prior to the procedure. Any pre-procedural antibiotics were given according to the performing physicians recommendation. Pt's information was confirmed on samples and samples were sent for analysis. [facility] reviewed information on labels sent with patient and confirmed the accuracy of all the labels.    Consent read and signed: Yes     Allergies   Allergen Reactions     Ezetimibe Difficulty breathing and " Shortness Of Breath     Pravastatin Difficulty breathing and Shortness Of Breath       12 samples were taken from the right and left, medial and, mid, and apex of the prostate respectively. Yes additional samples were taken [place] area  Vitals were repeated prior to patient leaving and instructions for postTransrectal Ultrasound/MRI Guided Prostate Biopsies  care were explained to the patient.      The following medication was given:     MEDICATION:  Lidocaine 1% Soln  ROUTE: RECTAL  SITE: PROSTATE/ INFILTRATION  DOSE: 10ML  LOT #: [id number]  : [employer]  EXPIRATION DATE: 01/2024  NDC#: 905627-730-16  Was there drug waste? Yes  Amount of drug waste (mL): 10ML.  Reason for waste:  Single use vial  Multi-dose vial: LIYA Belle  6/2/2022  8:34 AM

## 2022-06-02 NOTE — PROGRESS NOTES
PREPROCEDURE DIAGNOSIS: Prostate Cancer  POSTPROCEDURE DIAGNOSIS: Prostate Cancer  PROCEDURE: Transrectal ultrasound sizing and transrectal ultrasound guided prostatic needle biopsy, including MRI fusion targeting  for Ghassan Lambert who is a 71 year old male. History of prostate cancer on active surveillance.   SURGEON: Tim Faith  ANESTHESIA: 5 mL of 1% periprostatic block bilaterally   We previously obtained an MRI of the prostate and identified all PIRADS 3-5 lesions for targeting    Target Lesion #1 PIRADS 5 - right and left base    DESCRIPTION OF PROCEDURE: The procedure, the outcome, the anesthesia, and the risks were discussed with the patient. Informed consent was obtained and signed and a timeout was completed prior to the procedure. Digital rectal examination was performed with the below findings noted. Anesthesia was administered as noted above and the transrectal ultrasound probe was inserted, sizing was performed, and the below findings were noted. 2 core biopsies were taken from each of the MRI targets, and 12 core biopsies were taken as described below. The probe was then withdrawn. Patient tolerated the procedure well.   FINDINGS: Digital rectal exam reveals normal prostate. Total volume is 60 mL. Hypoechoic lesion bilaterally. US images were obtained and then fused with the MRI images.  The fused images were then used to guide the biopsy of the targeted lesions with 2 cores taken of each lesion.  We then performed random biopsies.  12 cores were taken with 6 on each side, base, mid and apex.  PLAN: Will follow-up next week to review results.  Tim Faith MD  Urology  [facility]

## 2022-06-06 ENCOUNTER — PREP FOR PROCEDURE (OUTPATIENT)
Dept: UROLOGY | Facility: CLINIC | Age: 72
End: 2022-06-06
Payer: COMMERCIAL

## 2022-06-06 DIAGNOSIS — C61 PROSTATE CANCER (H): Primary | ICD-10-CM

## 2022-06-06 LAB
PATH REPORT.COMMENTS IMP SPEC: ABNORMAL
PATH REPORT.COMMENTS IMP SPEC: YES
PATH REPORT.FINAL DX SPEC: ABNORMAL
PATH REPORT.GROSS SPEC: ABNORMAL
PATH REPORT.MICROSCOPIC SPEC OTHER STN: ABNORMAL
PATH REPORT.RELEVANT HX SPEC: ABNORMAL
PHOTO IMAGE: ABNORMAL

## 2022-06-06 RX ORDER — HEPARIN SODIUM 10000 [USP'U]/ML
5000 INJECTION, SOLUTION INTRAVENOUS; SUBCUTANEOUS
Status: CANCELLED | OUTPATIENT
Start: 2022-06-06

## 2022-06-06 NOTE — PROGRESS NOTES
Spoke with patient regarding pathology results. Planning tentatively to proceed with surgery. Will discuss further next week.    Tim Faith MD  Urology  [facility]

## 2022-06-16 ENCOUNTER — VIRTUAL VISIT (OUTPATIENT)
Dept: UROLOGY | Facility: CLINIC | Age: 72
End: 2022-06-16
Payer: COMMERCIAL

## 2022-06-16 VITALS — BODY MASS INDEX: 30.24 KG/M2 | WEIGHT: 216 LBS | HEIGHT: 71 IN

## 2022-06-16 DIAGNOSIS — C61 PROSTATE CANCER (H): Primary | ICD-10-CM

## 2022-06-16 PROCEDURE — 99214 OFFICE O/P EST MOD 30 MIN: CPT | Mod: 95 | Performed by: UROLOGY

## 2022-06-16 ASSESSMENT — PAIN SCALES - GENERAL: PAINLEVEL: NO PAIN (0)

## 2022-06-16 NOTE — PROGRESS NOTES
Ghassan Lambert is a 71 year old male who is being evaluated via a billable video visit.      How would you like to obtain your AVS? MyChart  If the video visit is dropped, the invitation should be resent by: Text to cell phone: [phone]  Will anyone else be joining your video visit? No    Video Start Time: 1:18 PM    CHIEF COMPLAINT   It was my pleasure to see Ghassan Lambert who is a 71 year old male for follow-up of Prostate Cancer.      HPI   Ghassan Lambret is a very pleasant 71 year old male who presents with a history of prostate cancer. On active surveillance since 2019. PSA remained low, however has jumped to 7.80 at last check. PIRADS 5 on MRI and TRUS demonstrates Dileep 3+4=7 prostate cancer.   He notes no significant urinary symptoms. Good erectile function.     TRUS 6/2/2022  Final Diagnosis   A.  Prostate, left base, biopsy-  Benign prostate tissue    B.  Prostate, left mid, biopsy-  Benign prostate tissue    C.  Prostate, left apex, biopsy-  Benign prostate tissue    D.  Prostate, right base, biopsy-  Adenocarcinoma, Badger's grade 3/4 in 2 of 2 needle core biopsies and 30% of submitted tissue, perineural invasion identified    E.  Prostate, right mid, biopsy-  Benign prostate tissue    F.  Prostate, right apex, biopsy-  Adenocarcinoma, Dileep grade 3/3 in 1 of 2 needle core biopsies and less than 5% of submitted tissue    G.  Prostate, target, not otherwise specified, biopsy-  Adenocarcinoma, Badger's grade 3/4 in 2 of 2 needle core biopsies and 80% of submitted tissue, perineural invasion identified     MRI Prostate 5/20/2022  IMPRESSION:  1. Based on the most suspicious abnormality, this exam is  characterized as PIRADS 5 - Clinically significant cancer is highly  likely to be present.  The most suspicious abnormality is located at  the right greater than left base peripheral zone at the 6-7 o'clock  and there is high suspicion of extraprostatic extension with  involvement of the  right seminal vesicle. This lesion is progressed  compared to 3/9/2021.  2. No suspicious adenopathy or evidence of pelvic metastases.          Allergies:   Ezetimibe and Pravastatin         Review of Systems:  From intake questionnaire     Skin: negative  Eyes: negative  Ears/Nose/Throat: negative  Respiratory: No shortness of breath, dyspnea on exertion, cough, or hemoptysis  Cardiovascular: No chest pain or palpitations  Gastrointestinal: negative; no nausea/vomiting, constipation or diarrhea  Genitourinary: as per HPI  Musculoskeletal: negative  Neurologic: negative  Psychiatric: negative  Hematologic/Lymphatic/Immunologic: negative  Endocrine: negative         Physical Exam:     Vitals:  No vitals were obtained today due to virtual visit.    Physical Exam   GENERAL: Healthy, alert and no distress  EYES: Eyes grossly normal to inspection.  No discharge or erythema, or obvious scleral/conjunctival abnormalities.  RESP: No audible wheeze, cough, or visible cyanosis.  No visible retractions or increased work of breathing.    SKIN: Visible skin clear. No significant rash, abnormal pigmentation or lesions.  NEURO: Cranial nerves grossly intact.  Mentation and speech appropriate for age.  PSYCH: Mentation appears normal, affect normal/bright, judgement and insight intact, normal speech and appearance well-groomed.      Outside and Past Medical records:    Review of the result(s) of each unique test - MRI, pathology, PSA         Assessment and Plan:     Assessment: 71 year old male with [place] 3+4=7 prostate cancer, PSA 7.80 and PIRADS 5 on MRI. Previously on active surveillance. We had an extensive discussion about the significance of localized, prostate cancer. We discussed the options for treatment of a localized prostate cancer including active surveillance, brachytherapy, external beam radiation therapy, and surgical removal. We discussed that based on his Dileep score he is no longer an ideal candidate for  active surveillance.       We discussed the relative merits of robotic assisted radical prostatectomy. We also discussed the advantages and disadvantages and roles of open surgery vs. laparoscopic (and Da Oz assisted) surgery. The anticipated post-operative course was explained, including an anticipated 1-2 day hospital stay. Catheter will remain in place 7-10 days.      We discussed that there was no clear evidence of advantage between surgery and radiation with regard to risk of recurrence. Regarding radiation, we discussed risks including but not limited to cancer recurrence, exacerbation of voiding symptoms or hematuria, urinary retention, incontinence, stricture of the urinary tract, induction of second malignancy, and risks of rectal symptoms or bleeding.  We discussed fact that rectal symptoms may be more common with radiation than with other treatment modalities. With radiation therapy, we also discussed the difficulties in diagnosing recurrent disease at an early stage due to variability in PSA levels and the fact that most patients are not candidates for local salvage therapy when biochemical recurrence is declared.  We also discussed the significant morbidity of post-radiation local salvage therapy in terms of perioperative complications, erectile dysfunction and urinary incontinence. With surgery, we also discussed the potential advantage over radiation therapy in that biochemical recurrence can be detected at a relatively earlier stage and that salvage radiotherapy is successful in controlling recurrent disease in a substantial proportion of patients.  We also discussed that salvage radiotherapy was associated with a considerably more favorable morbidity profile compared to local salvage therapies for recurrent disease post-radiation.     We discussed option for further discussion with radiation oncology, but patient is interested in surgery and would prefer to defer at this time.      The risks,  benefits, alternatives, and personnel involved with robotic prostatectomy were discussed in detail. Specifically, we discussed that risks include but are not limited to anesthetic complications including stroke, MI, DVT/PE, as well as risk of bleeding requiring transfusion, bowel injury, infection, lymphocele, ureteral injury, nerve injury,urine leak and other potentially unforseen complications. Also discussed the risk of bladder neck contracture and other urinary symptoms after procedure. In addition, we discussed risk long-term of urinary incontinence and erectile dysfunction following the procedure. Finally, we discussed risk for adverse pathologic features, including positive surgical margins and potential for post-surgical radiation, hormone ablation and long-term need for PSA monitoring.  All questions were answered in detail.  A written informed consent will be finalized on the morning of the procedure.    Plan:  Proceed with robotic-assisted laparoscopic radical prostatectomy with bilateral pelvic lymphadenectomy    Video-Visit Details    Type of service:  Video Visit    Video End Time:1:39 PM    Originating Location (pt. Location): Home    Distant Location (provider location):  [facility] AND [facility] FOR PROSTATE AND UROLOGIC CANCERS    Platform used for Video Visit: [employer]    37 minutes spent on the date of the encounter including direct interaction with the patient, performing chart review, history and exam, documentation and further activities as noted above.    Tim Faith MD  Urology  [facility]

## 2022-07-08 ENCOUNTER — TRANSFERRED RECORDS (OUTPATIENT)
Dept: MULTI SPECIALTY CLINIC | Facility: CLINIC | Age: 72
End: 2022-07-08

## 2022-07-08 LAB
ALT SERPL-CCNC: 29 U/L (ref 9–46)
AST SERPL-CCNC: 25 U/L (ref 10–35)
CHOLESTEROL (EXTERNAL): 162 MG/DL
CREATININE (EXTERNAL): 1.16 MG/DL (ref 0.7–1.18)
GFR ESTIMATED (EXTERNAL): 63 ML/MIN/1.73M2
GFR ESTIMATED (IF AFRICAN AMERICAN) (EXTERNAL): 73 ML/MIN/1.73M2
GLUCOSE (EXTERNAL): 104 MG/DL (ref 65–99)
HDLC SERPL-MCNC: 34 MG/DL
LDL CHOLESTEROL (EXTERNAL): 96 MG/DL
NON HDL CHOLESTEROL (EXTERNAL): 128 MG/DL
POTASSIUM (EXTERNAL): 4.2 MMOL/L (ref 3.5–5.3)
TRIGLYCERIDES (EXTERNAL): 229 MG/DL
TSH SERPL-ACNC: 2.02 MIU/L (ref 0.4–4.5)

## 2022-07-18 ENCOUNTER — TELEPHONE (OUTPATIENT)
Dept: ONCOLOGY | Facility: CLINIC | Age: 72
End: 2022-07-18

## 2022-07-18 NOTE — TELEPHONE ENCOUNTER
Pt calling in to report that he was diagnosed with a staph infection in his ear by his dermatologist today, and was prescribed a 7 day course of antibiotics.    Pt has surgery this Friday with Dr. Faith, and is wondering if he should start this course of abx today.    Writer contacted Dr. Faith's office, they are aware and will contact pt.

## 2022-07-19 ENCOUNTER — LAB (OUTPATIENT)
Dept: LAB | Facility: CLINIC | Age: 72
End: 2022-07-19
Payer: MEDICARE

## 2022-07-19 DIAGNOSIS — Z20.822 ENCOUNTER FOR LABORATORY TESTING FOR COVID-19 VIRUS: ICD-10-CM

## 2022-07-19 LAB — SARS-COV-2 RNA RESP QL NAA+PROBE: NEGATIVE

## 2022-07-19 PROCEDURE — U0005 INFEC AGEN DETEC AMPLI PROBE: HCPCS

## 2022-07-19 PROCEDURE — U0003 INFECTIOUS AGENT DETECTION BY NUCLEIC ACID (DNA OR RNA); SEVERE ACUTE RESPIRATORY SYNDROME CORONAVIRUS 2 (SARS-COV-2) (CORONAVIRUS DISEASE [COVID-19]), AMPLIFIED PROBE TECHNIQUE, MAKING USE OF HIGH THROUGHPUT TECHNOLOGIES AS DESCRIBED BY CMS-2020-01-R: HCPCS

## 2022-07-21 RX ORDER — VITAMIN B COMPLEX
1 TABLET ORAL DAILY
COMMUNITY

## 2022-07-21 RX ORDER — HYDROCORTISONE 10 MG/ML
LOTION TOPICAL DAILY PRN
COMMUNITY

## 2022-07-21 RX ORDER — DICLOXACILLIN SODIUM 500 MG
500 CAPSULE ORAL 2 TIMES DAILY
COMMUNITY
End: 2022-11-10

## 2022-07-21 NOTE — PROGRESS NOTES
PTA medications updated by Medication Scribe prior to surgery via phone call with patient (last doses completed by Nurse)     Medication history sources: Patient, Surescripts and H&P  In the past week, patient estimated taking medication this percent of the time: Greater than 90%  Adherence assessment: N/A Not Observed    Significant changes made to the medication list:  Patient reports no longer taking the following meds (med scribe removed from PTA med list): Tobramycin Eye Solution      Additional medication history information:   None    Medication reconciliation completed by provider prior to medication history? No    Time spent in this activity: 40 minutes    The information provided in this note is only as accurate as the sources available at the time of update(s)    Prior to Admission medications    Medication Sig Last Dose Taking? Auth Provider Long Term End Date   allopurinol (ZYLOPRIM) 300 MG tablet Take 300 mg by mouth daily  at am Yes Reported, Patient     dicloxacillin (DYNAPEN) 500 MG capsule Take 500 mg by mouth 2 times daily 7/21/2022 at pm Yes Reported, Patient     hydrocortisone (CORTIZONE 10) 1 % external lotion Apply topically daily as needed for rash (inner ear) 7/20/2022 at prn Yes Reported, Patient     lisinopril (PRINIVIL/ZESTRIL) 10 MG tablet Take 10 mg by mouth daily  at am Yes Reported, Patient Yes    Multiple Vitamin (MULTIVITAMIN PO) Take 1 capsule by mouth daily 7/15/2022 at am Yes Reported, Patient     omeprazole (PRILOSEC) 40 MG DR capsule Take 40 mg by mouth daily  at am Yes Reported, Patient     simvastatin (ZOCOR) 20 MG tablet Take 20 mg by mouth daily  at am Yes Reported, Patient Yes    Vitamin D3 (CHOLECALCIFEROL) 25 mcg (1000 units) tablet Take 1 tablet by mouth daily 7/15/2022 at am Yes Reported, Patient       Medication history completed by:    Cullen English CPhT  Medication Scribe  [facility]

## 2022-07-22 ENCOUNTER — ANESTHESIA (OUTPATIENT)
Dept: SURGERY | Facility: CLINIC | Age: 72
End: 2022-07-22
Payer: MEDICARE

## 2022-07-22 ENCOUNTER — HOSPITAL ENCOUNTER (OUTPATIENT)
Facility: CLINIC | Age: 72
Discharge: HOME | End: 2022-07-23
Attending: UROLOGY | Admitting: UROLOGY
Payer: MEDICARE

## 2022-07-22 ENCOUNTER — ANESTHESIA EVENT (OUTPATIENT)
Dept: SURGERY | Facility: CLINIC | Age: 72
End: 2022-07-22
Payer: MEDICARE

## 2022-07-22 DIAGNOSIS — C61 PROSTATE CANCER (H): Primary | ICD-10-CM

## 2022-07-22 LAB
ABO/RH(D): NORMAL
ANTIBODY SCREEN: NEGATIVE
POTASSIUM BLD-SCNC: 3.8 MMOL/L (ref 3.4–5.3)
SPECIMEN EXPIRATION DATE: NORMAL

## 2022-07-22 PROCEDURE — 250N000009 HC RX 250: Performed by: NURSE ANESTHETIST, CERTIFIED REGISTERED

## 2022-07-22 PROCEDURE — 88307 TISSUE EXAM BY PATHOLOGIST: CPT | Mod: TC | Performed by: UROLOGY

## 2022-07-22 PROCEDURE — 250N000025 HC SEVOFLURANE, PER MIN: Performed by: UROLOGY

## 2022-07-22 PROCEDURE — 250N000011 HC RX IP 250 OP 636: Performed by: UROLOGY

## 2022-07-22 PROCEDURE — 88331 PATH CONSLTJ SURG 1 BLK 1SPC: CPT | Mod: 26 | Performed by: PATHOLOGY

## 2022-07-22 PROCEDURE — 250N000011 HC RX IP 250 OP 636: Performed by: NURSE ANESTHETIST, CERTIFIED REGISTERED

## 2022-07-22 PROCEDURE — 250N000011 HC RX IP 250 OP 636: Performed by: ANESTHESIOLOGY

## 2022-07-22 PROCEDURE — 86850 RBC ANTIBODY SCREEN: CPT | Performed by: UROLOGY

## 2022-07-22 PROCEDURE — 999N000141 HC STATISTIC PRE-PROCEDURE NURSING ASSESSMENT: Performed by: UROLOGY

## 2022-07-22 PROCEDURE — 258N000003 HC RX IP 258 OP 636: Performed by: ANESTHESIOLOGY

## 2022-07-22 PROCEDURE — 250N000013 HC RX MED GY IP 250 OP 250 PS 637: Performed by: UROLOGY

## 2022-07-22 PROCEDURE — 88309 TISSUE EXAM BY PATHOLOGIST: CPT | Mod: 26 | Performed by: PATHOLOGY

## 2022-07-22 PROCEDURE — 250N000009 HC RX 250: Performed by: ANESTHESIOLOGY

## 2022-07-22 PROCEDURE — 250N000009 HC RX 250: Performed by: UROLOGY

## 2022-07-22 PROCEDURE — 55866 LAPS SURG PRST8ECT RPBIC RAD: CPT | Mod: 22 | Performed by: UROLOGY

## 2022-07-22 PROCEDURE — 370N000017 HC ANESTHESIA TECHNICAL FEE, PER MIN: Performed by: UROLOGY

## 2022-07-22 PROCEDURE — 258N000001 HC RX 258: Performed by: UROLOGY

## 2022-07-22 PROCEDURE — 360N000080 HC SURGERY LEVEL 7, PER MIN: Performed by: UROLOGY

## 2022-07-22 PROCEDURE — 88331 PATH CONSLTJ SURG 1 BLK 1SPC: CPT | Mod: TC | Performed by: UROLOGY

## 2022-07-22 PROCEDURE — 84132 ASSAY OF SERUM POTASSIUM: CPT | Performed by: ANESTHESIOLOGY

## 2022-07-22 PROCEDURE — 250N000011 HC RX IP 250 OP 636: Performed by: STUDENT IN AN ORGANIZED HEALTH CARE EDUCATION/TRAINING PROGRAM

## 2022-07-22 PROCEDURE — 88307 TISSUE EXAM BY PATHOLOGIST: CPT | Mod: 26 | Performed by: PATHOLOGY

## 2022-07-22 PROCEDURE — 710N000009 HC RECOVERY PHASE 1, LEVEL 1, PER MIN: Performed by: UROLOGY

## 2022-07-22 PROCEDURE — 272N000001 HC OR GENERAL SUPPLY STERILE: Performed by: UROLOGY

## 2022-07-22 PROCEDURE — 36415 COLL VENOUS BLD VENIPUNCTURE: CPT | Performed by: ANESTHESIOLOGY

## 2022-07-22 PROCEDURE — 38571 LAPAROSCOPY LYMPHADENECTOMY: CPT | Mod: 51 | Performed by: UROLOGY

## 2022-07-22 PROCEDURE — 250N000013 HC RX MED GY IP 250 OP 250 PS 637: Performed by: STUDENT IN AN ORGANIZED HEALTH CARE EDUCATION/TRAINING PROGRAM

## 2022-07-22 RX ORDER — NEOMYCIN/BACITRACIN/POLYMYXINB 3.5-400-5K
OINTMENT (GRAM) TOPICAL 4 TIMES DAILY PRN
Status: DISCONTINUED | OUTPATIENT
Start: 2022-07-22 | End: 2022-07-23 | Stop reason: HOSPADM

## 2022-07-22 RX ORDER — LABETALOL HYDROCHLORIDE 5 MG/ML
10 INJECTION, SOLUTION INTRAVENOUS
Status: DISCONTINUED | OUTPATIENT
Start: 2022-07-22 | End: 2022-07-22 | Stop reason: HOSPADM

## 2022-07-22 RX ORDER — DICLOXACILLIN SODIUM 250 MG
500 CAPSULE ORAL 2 TIMES DAILY
Status: DISCONTINUED | OUTPATIENT
Start: 2022-07-22 | End: 2022-07-22

## 2022-07-22 RX ORDER — SODIUM CHLORIDE 9 MG/ML
INJECTION, SOLUTION INTRAVENOUS CONTINUOUS
Status: DISCONTINUED | OUTPATIENT
Start: 2022-07-22 | End: 2022-07-23 | Stop reason: HOSPADM

## 2022-07-22 RX ORDER — OXYCODONE HYDROCHLORIDE 5 MG/1
5 TABLET ORAL EVERY 4 HOURS PRN
Status: DISCONTINUED | OUTPATIENT
Start: 2022-07-22 | End: 2022-07-23 | Stop reason: HOSPADM

## 2022-07-22 RX ORDER — HYDROMORPHONE HCL IN WATER/PF 6 MG/30 ML
0.2 PATIENT CONTROLLED ANALGESIA SYRINGE INTRAVENOUS
Status: DISCONTINUED | OUTPATIENT
Start: 2022-07-22 | End: 2022-07-23 | Stop reason: HOSPADM

## 2022-07-22 RX ORDER — DICLOXACILLIN SODIUM 500 MG
500 CAPSULE ORAL 2 TIMES DAILY
Status: DISCONTINUED | OUTPATIENT
Start: 2022-07-22 | End: 2022-07-23 | Stop reason: HOSPADM

## 2022-07-22 RX ORDER — AMOXICILLIN 250 MG
1 CAPSULE ORAL 2 TIMES DAILY PRN
Qty: 20 TABLET | Refills: 0 | Status: SHIPPED | OUTPATIENT
Start: 2022-07-22 | End: 2022-11-10

## 2022-07-22 RX ORDER — FENTANYL CITRATE 50 UG/ML
25 INJECTION, SOLUTION INTRAMUSCULAR; INTRAVENOUS EVERY 5 MIN PRN
Status: DISCONTINUED | OUTPATIENT
Start: 2022-07-22 | End: 2022-07-22 | Stop reason: HOSPADM

## 2022-07-22 RX ORDER — ONDANSETRON 2 MG/ML
4 INJECTION INTRAMUSCULAR; INTRAVENOUS EVERY 30 MIN PRN
Status: DISCONTINUED | OUTPATIENT
Start: 2022-07-22 | End: 2022-07-22 | Stop reason: HOSPADM

## 2022-07-22 RX ORDER — PROPOFOL 10 MG/ML
INJECTION, EMULSION INTRAVENOUS CONTINUOUS PRN
Status: DISCONTINUED | OUTPATIENT
Start: 2022-07-22 | End: 2022-07-22

## 2022-07-22 RX ORDER — MAGNESIUM HYDROXIDE 1200 MG/15ML
LIQUID ORAL PRN
Status: DISCONTINUED | OUTPATIENT
Start: 2022-07-22 | End: 2022-07-22 | Stop reason: HOSPADM

## 2022-07-22 RX ORDER — LIDOCAINE 40 MG/G
CREAM TOPICAL
Status: DISCONTINUED | OUTPATIENT
Start: 2022-07-22 | End: 2022-07-23 | Stop reason: HOSPADM

## 2022-07-22 RX ORDER — NALOXONE HYDROCHLORIDE 0.4 MG/ML
0.4 INJECTION, SOLUTION INTRAMUSCULAR; INTRAVENOUS; SUBCUTANEOUS
Status: DISCONTINUED | OUTPATIENT
Start: 2022-07-22 | End: 2022-07-23 | Stop reason: HOSPADM

## 2022-07-22 RX ORDER — HEPARIN SODIUM 5000 [USP'U]/.5ML
5000 INJECTION, SOLUTION INTRAVENOUS; SUBCUTANEOUS
Status: COMPLETED | OUTPATIENT
Start: 2022-07-22 | End: 2022-07-22

## 2022-07-22 RX ORDER — SODIUM CHLORIDE, SODIUM LACTATE, POTASSIUM CHLORIDE, CALCIUM CHLORIDE 600; 310; 30; 20 MG/100ML; MG/100ML; MG/100ML; MG/100ML
INJECTION, SOLUTION INTRAVENOUS CONTINUOUS
Status: DISCONTINUED | OUTPATIENT
Start: 2022-07-22 | End: 2022-07-22 | Stop reason: HOSPADM

## 2022-07-22 RX ORDER — OXYBUTYNIN CHLORIDE 5 MG/1
5 TABLET, EXTENDED RELEASE ORAL AT BEDTIME
Status: DISCONTINUED | OUTPATIENT
Start: 2022-07-22 | End: 2022-07-23 | Stop reason: HOSPADM

## 2022-07-22 RX ORDER — LIDOCAINE HYDROCHLORIDE 20 MG/ML
INJECTION, SOLUTION INFILTRATION; PERINEURAL PRN
Status: DISCONTINUED | OUTPATIENT
Start: 2022-07-22 | End: 2022-07-22

## 2022-07-22 RX ORDER — OXYBUTYNIN CHLORIDE 5 MG/1
5 TABLET, EXTENDED RELEASE ORAL DAILY
Qty: 7 TABLET | Refills: 0 | Status: SHIPPED | OUTPATIENT
Start: 2022-07-22 | End: 2022-11-10

## 2022-07-22 RX ORDER — BUPIVACAINE HYDROCHLORIDE 2.5 MG/ML
INJECTION, SOLUTION INFILTRATION; PERINEURAL PRN
Status: DISCONTINUED | OUTPATIENT
Start: 2022-07-22 | End: 2022-07-22 | Stop reason: HOSPADM

## 2022-07-22 RX ORDER — OXYCODONE HYDROCHLORIDE 5 MG/1
10 TABLET ORAL EVERY 4 HOURS PRN
Status: DISCONTINUED | OUTPATIENT
Start: 2022-07-22 | End: 2022-07-23 | Stop reason: HOSPADM

## 2022-07-22 RX ORDER — POLYETHYLENE GLYCOL 3350 17 G/17G
17 POWDER, FOR SOLUTION ORAL DAILY
Status: DISCONTINUED | OUTPATIENT
Start: 2022-07-22 | End: 2022-07-23 | Stop reason: HOSPADM

## 2022-07-22 RX ORDER — PROPOFOL 10 MG/ML
INJECTION, EMULSION INTRAVENOUS PRN
Status: DISCONTINUED | OUTPATIENT
Start: 2022-07-22 | End: 2022-07-22

## 2022-07-22 RX ORDER — ONDANSETRON 4 MG/1
4 TABLET, ORALLY DISINTEGRATING ORAL EVERY 6 HOURS PRN
Status: DISCONTINUED | OUTPATIENT
Start: 2022-07-22 | End: 2022-07-23 | Stop reason: HOSPADM

## 2022-07-22 RX ORDER — HYDROMORPHONE HCL IN WATER/PF 6 MG/30 ML
0.2 PATIENT CONTROLLED ANALGESIA SYRINGE INTRAVENOUS EVERY 5 MIN PRN
Status: DISCONTINUED | OUTPATIENT
Start: 2022-07-22 | End: 2022-07-22 | Stop reason: HOSPADM

## 2022-07-22 RX ORDER — LISINOPRIL 10 MG/1
10 TABLET ORAL DAILY
Status: DISCONTINUED | OUTPATIENT
Start: 2022-07-22 | End: 2022-07-23 | Stop reason: HOSPADM

## 2022-07-22 RX ORDER — ATROPA BELLADONNA AND OPIUM 16.2; 3 MG/1; MG/1
30 SUPPOSITORY RECTAL 3 TIMES DAILY PRN
Status: DISCONTINUED | OUTPATIENT
Start: 2022-07-22 | End: 2022-07-23 | Stop reason: HOSPADM

## 2022-07-22 RX ORDER — LIDOCAINE 50 MG/G
OINTMENT TOPICAL 4 TIMES DAILY PRN
Status: DISCONTINUED | OUTPATIENT
Start: 2022-07-22 | End: 2022-07-23 | Stop reason: HOSPADM

## 2022-07-22 RX ORDER — KETOROLAC TROMETHAMINE 30 MG/ML
15 INJECTION, SOLUTION INTRAMUSCULAR; INTRAVENOUS EVERY 6 HOURS
Status: DISCONTINUED | OUTPATIENT
Start: 2022-07-22 | End: 2022-07-23 | Stop reason: HOSPADM

## 2022-07-22 RX ORDER — NALOXONE HYDROCHLORIDE 0.4 MG/ML
0.2 INJECTION, SOLUTION INTRAMUSCULAR; INTRAVENOUS; SUBCUTANEOUS
Status: DISCONTINUED | OUTPATIENT
Start: 2022-07-22 | End: 2022-07-23 | Stop reason: HOSPADM

## 2022-07-22 RX ORDER — PANTOPRAZOLE SODIUM 20 MG/1
40 TABLET, DELAYED RELEASE ORAL 2 TIMES DAILY
Status: DISCONTINUED | OUTPATIENT
Start: 2022-07-23 | End: 2022-07-23 | Stop reason: HOSPADM

## 2022-07-22 RX ORDER — PROCHLORPERAZINE MALEATE 5 MG
5 TABLET ORAL EVERY 6 HOURS PRN
Status: DISCONTINUED | OUTPATIENT
Start: 2022-07-22 | End: 2022-07-23 | Stop reason: HOSPADM

## 2022-07-22 RX ORDER — HYDROMORPHONE HCL IN WATER/PF 6 MG/30 ML
0.4 PATIENT CONTROLLED ANALGESIA SYRINGE INTRAVENOUS
Status: DISCONTINUED | OUTPATIENT
Start: 2022-07-22 | End: 2022-07-23 | Stop reason: HOSPADM

## 2022-07-22 RX ORDER — OXYCODONE HYDROCHLORIDE 5 MG/1
5 TABLET ORAL EVERY 6 HOURS PRN
Qty: 12 TABLET | Refills: 0 | Status: SHIPPED | OUTPATIENT
Start: 2022-07-22 | End: 2022-07-25

## 2022-07-22 RX ORDER — ONDANSETRON 4 MG/1
4 TABLET, ORALLY DISINTEGRATING ORAL EVERY 30 MIN PRN
Status: DISCONTINUED | OUTPATIENT
Start: 2022-07-22 | End: 2022-07-22 | Stop reason: HOSPADM

## 2022-07-22 RX ORDER — ALLOPURINOL 300 MG/1
300 TABLET ORAL DAILY
Status: DISCONTINUED | OUTPATIENT
Start: 2022-07-23 | End: 2022-07-23 | Stop reason: HOSPADM

## 2022-07-22 RX ORDER — AMOXICILLIN 250 MG
1 CAPSULE ORAL 2 TIMES DAILY
Status: DISCONTINUED | OUTPATIENT
Start: 2022-07-22 | End: 2022-07-23 | Stop reason: HOSPADM

## 2022-07-22 RX ORDER — FENTANYL CITRATE 50 UG/ML
INJECTION, SOLUTION INTRAMUSCULAR; INTRAVENOUS PRN
Status: DISCONTINUED | OUTPATIENT
Start: 2022-07-22 | End: 2022-07-22

## 2022-07-22 RX ORDER — CIPROFLOXACIN 500 MG/1
500 TABLET, FILM COATED ORAL ONCE
Qty: 1 TABLET | Refills: 0 | Status: SHIPPED | OUTPATIENT
Start: 2022-07-22 | End: 2022-07-22

## 2022-07-22 RX ORDER — SIMVASTATIN 20 MG
20 TABLET ORAL DAILY
Status: DISCONTINUED | OUTPATIENT
Start: 2022-07-23 | End: 2022-07-23 | Stop reason: HOSPADM

## 2022-07-22 RX ORDER — DEXAMETHASONE SODIUM PHOSPHATE 4 MG/ML
INJECTION, SOLUTION INTRA-ARTICULAR; INTRALESIONAL; INTRAMUSCULAR; INTRAVENOUS; SOFT TISSUE PRN
Status: DISCONTINUED | OUTPATIENT
Start: 2022-07-22 | End: 2022-07-22

## 2022-07-22 RX ORDER — CEFAZOLIN SODIUM/WATER 2 G/20 ML
2 SYRINGE (ML) INTRAVENOUS
Status: COMPLETED | OUTPATIENT
Start: 2022-07-22 | End: 2022-07-22

## 2022-07-22 RX ORDER — ACETAMINOPHEN 325 MG/1
650 TABLET ORAL EVERY 6 HOURS
Status: DISCONTINUED | OUTPATIENT
Start: 2022-07-22 | End: 2022-07-23 | Stop reason: HOSPADM

## 2022-07-22 RX ORDER — CEFAZOLIN SODIUM/WATER 2 G/20 ML
2 SYRINGE (ML) INTRAVENOUS SEE ADMIN INSTRUCTIONS
Status: DISCONTINUED | OUTPATIENT
Start: 2022-07-22 | End: 2022-07-22 | Stop reason: HOSPADM

## 2022-07-22 RX ORDER — ONDANSETRON 2 MG/ML
4 INJECTION INTRAMUSCULAR; INTRAVENOUS EVERY 6 HOURS PRN
Status: DISCONTINUED | OUTPATIENT
Start: 2022-07-22 | End: 2022-07-23 | Stop reason: HOSPADM

## 2022-07-22 RX ORDER — ONDANSETRON 2 MG/ML
INJECTION INTRAMUSCULAR; INTRAVENOUS PRN
Status: DISCONTINUED | OUTPATIENT
Start: 2022-07-22 | End: 2022-07-22

## 2022-07-22 RX ADMIN — ROCURONIUM BROMIDE 10 MG: 50 INJECTION, SOLUTION INTRAVENOUS at 16:13

## 2022-07-22 RX ADMIN — LIDOCAINE HYDROCHLORIDE 100 MG: 20 INJECTION, SOLUTION INFILTRATION; PERINEURAL at 12:26

## 2022-07-22 RX ADMIN — HYDROMORPHONE HYDROCHLORIDE 0.5 MG: 1 INJECTION, SOLUTION INTRAMUSCULAR; INTRAVENOUS; SUBCUTANEOUS at 13:47

## 2022-07-22 RX ADMIN — PHENYLEPHRINE HYDROCHLORIDE 50 MCG: 10 INJECTION INTRAVENOUS at 15:29

## 2022-07-22 RX ADMIN — Medication 2 G: at 16:17

## 2022-07-22 RX ADMIN — DEXMEDETOMIDINE HYDROCHLORIDE 8 MCG: 100 INJECTION, SOLUTION INTRAVENOUS at 12:22

## 2022-07-22 RX ADMIN — SODIUM CHLORIDE, POTASSIUM CHLORIDE, SODIUM LACTATE AND CALCIUM CHLORIDE: 600; 310; 30; 20 INJECTION, SOLUTION INTRAVENOUS at 10:47

## 2022-07-22 RX ADMIN — HEPARIN SODIUM 5000 UNITS: 5000 INJECTION, SOLUTION INTRAVENOUS; SUBCUTANEOUS at 11:05

## 2022-07-22 RX ADMIN — DICLOXACILLIN SODIUM 500 MG: 500 CAPSULE ORAL at 22:08

## 2022-07-22 RX ADMIN — HYDROMORPHONE HYDROCHLORIDE 0.2 MG: 0.2 INJECTION, SOLUTION INTRAMUSCULAR; INTRAVENOUS; SUBCUTANEOUS at 17:44

## 2022-07-22 RX ADMIN — SODIUM CHLORIDE, POTASSIUM CHLORIDE, SODIUM LACTATE AND CALCIUM CHLORIDE: 600; 310; 30; 20 INJECTION, SOLUTION INTRAVENOUS at 16:24

## 2022-07-22 RX ADMIN — ROCURONIUM BROMIDE 20 MG: 50 INJECTION, SOLUTION INTRAVENOUS at 14:46

## 2022-07-22 RX ADMIN — PHENYLEPHRINE HYDROCHLORIDE 50 MCG: 10 INJECTION INTRAVENOUS at 16:10

## 2022-07-22 RX ADMIN — PHENYLEPHRINE HYDROCHLORIDE 50 MCG: 10 INJECTION INTRAVENOUS at 15:13

## 2022-07-22 RX ADMIN — KETOROLAC TROMETHAMINE 15 MG: 30 INJECTION, SOLUTION INTRAMUSCULAR at 17:19

## 2022-07-22 RX ADMIN — ONDANSETRON 4 MG: 2 INJECTION INTRAMUSCULAR; INTRAVENOUS at 16:20

## 2022-07-22 RX ADMIN — Medication 2 G: at 12:19

## 2022-07-22 RX ADMIN — SODIUM CHLORIDE, POTASSIUM CHLORIDE, SODIUM LACTATE AND CALCIUM CHLORIDE: 600; 310; 30; 20 INJECTION, SOLUTION INTRAVENOUS at 14:27

## 2022-07-22 RX ADMIN — FENTANYL CITRATE 25 MCG: 50 INJECTION, SOLUTION INTRAMUSCULAR; INTRAVENOUS at 17:29

## 2022-07-22 RX ADMIN — ACETAMINOPHEN 650 MG: 325 TABLET ORAL at 19:43

## 2022-07-22 RX ADMIN — ROCURONIUM BROMIDE 50 MG: 50 INJECTION, SOLUTION INTRAVENOUS at 12:26

## 2022-07-22 RX ADMIN — SENNOSIDES AND DOCUSATE SODIUM 1 TABLET: 50; 8.6 TABLET ORAL at 21:23

## 2022-07-22 RX ADMIN — OXYBUTYNIN CHLORIDE 5 MG: 5 TABLET, EXTENDED RELEASE ORAL at 21:23

## 2022-07-22 RX ADMIN — DEXAMETHASONE SODIUM PHOSPHATE 4 MG: 4 INJECTION, SOLUTION INTRA-ARTICULAR; INTRALESIONAL; INTRAMUSCULAR; INTRAVENOUS; SOFT TISSUE at 12:39

## 2022-07-22 RX ADMIN — POLYETHYLENE GLYCOL 3350 17 G: 17 POWDER, FOR SOLUTION ORAL at 19:44

## 2022-07-22 RX ADMIN — ROCURONIUM BROMIDE 20 MG: 50 INJECTION, SOLUTION INTRAVENOUS at 13:32

## 2022-07-22 RX ADMIN — ROCURONIUM BROMIDE 30 MG: 50 INJECTION, SOLUTION INTRAVENOUS at 15:17

## 2022-07-22 RX ADMIN — ROCURONIUM BROMIDE 20 MG: 50 INJECTION, SOLUTION INTRAVENOUS at 14:17

## 2022-07-22 RX ADMIN — ROCURONIUM BROMIDE 30 MG: 50 INJECTION, SOLUTION INTRAVENOUS at 13:03

## 2022-07-22 RX ADMIN — PROPOFOL 200 MG: 10 INJECTION, EMULSION INTRAVENOUS at 12:26

## 2022-07-22 RX ADMIN — SUGAMMADEX 200 MG: 100 INJECTION, SOLUTION INTRAVENOUS at 16:49

## 2022-07-22 RX ADMIN — HYDROMORPHONE HYDROCHLORIDE 0.2 MG: 0.2 INJECTION, SOLUTION INTRAMUSCULAR; INTRAVENOUS; SUBCUTANEOUS at 17:49

## 2022-07-22 RX ADMIN — DEXMEDETOMIDINE HYDROCHLORIDE 4 MCG: 100 INJECTION, SOLUTION INTRAVENOUS at 12:45

## 2022-07-22 RX ADMIN — FENTANYL CITRATE 25 MCG: 50 INJECTION, SOLUTION INTRAMUSCULAR; INTRAVENOUS at 17:23

## 2022-07-22 RX ADMIN — ROCURONIUM BROMIDE 20 MG: 50 INJECTION, SOLUTION INTRAVENOUS at 16:01

## 2022-07-22 RX ADMIN — FENTANYL CITRATE 50 MCG: 50 INJECTION, SOLUTION INTRAMUSCULAR; INTRAVENOUS at 12:19

## 2022-07-22 RX ADMIN — HYDROMORPHONE HYDROCHLORIDE 0.2 MG: 0.2 INJECTION, SOLUTION INTRAMUSCULAR; INTRAVENOUS; SUBCUTANEOUS at 17:39

## 2022-07-22 RX ADMIN — FENTANYL CITRATE 50 MCG: 50 INJECTION, SOLUTION INTRAMUSCULAR; INTRAVENOUS at 12:45

## 2022-07-22 RX ADMIN — PROPOFOL 25 MCG/KG/MIN: 10 INJECTION, EMULSION INTRAVENOUS at 12:30

## 2022-07-22 RX ADMIN — DEXMEDETOMIDINE HYDROCHLORIDE 8 MCG: 100 INJECTION, SOLUTION INTRAVENOUS at 16:28

## 2022-07-22 ASSESSMENT — LIFESTYLE VARIABLES: TOBACCO_USE: 1

## 2022-07-22 ASSESSMENT — ACTIVITIES OF DAILY LIVING (ADL)
ADLS_ACUITY_SCORE: 18

## 2022-07-22 ASSESSMENT — COPD QUESTIONNAIRES: COPD: 1

## 2022-07-22 NOTE — OP NOTE
OPERATIVE REPORT  DATE OF PROCEDURE: 7/22/2022  PRE-PROCEDURE DIAGNOSIS: Prostate cancer.   POST-PROCEDURE DIAGNOSIS: Prostate cancer.   PROCEDURES PERFORMED:   1) Robotic-assisted laparoscopic radical prostatectomy  2) Bilateral pelvic lymphadenectomy  3) Laparoscopic lysis of adhesions - > 30 minutes  SURGEON: Tim Faith MD - Present for the entire procedure  ASSISTANT: Stone Rebolledo MD  SECOND ASSISTANT: Cherise Mary  ANESTHESIA: General with endotracheal intubation.   INDICATIONS FOR PROCEDURE: Carmelo Lambert is a 71 year-old gentleman with a history of Dileep 3+4 = 7 adenocarcinoma of the prostate. He has been counseled regarding treatment options and presents to undergo surgery.   Findings: Prostate and seminal vesicles removed without complication. Lymph nodes grossly negative. Water-tight anastomosis. Frozen section from right base near seminal vesicle was negative or malignancy. Omental adhesions to the anterior abdominal wall required > 30 minutes of adhesiolysis.  Specimens:    ID Type Source Tests Collected by Time Destination   1 : right base pedical margin Tissue Prostate SURGICAL PATHOLOGY EXAM Tim Faith MD 7/22/2022  3:15 PM    2 : BILATERAL PELVIC LYMPH NODES  Tissue Lymph Nodes, Pelvis, Regional SURGICAL PATHOLOGY EXAM Tim Faith MD 7/22/2022  4:29 PM    3 : PROSTATE AND SEMINAL VESICLES Tissue Prostate SURGICAL PATHOLOGY EXAM Tim Faith MD 7/22/2022  4:29 PM      DESCRIPTION OF PROCEDURE: After fully informed voluntary consent was obtained, the patient was brought into the operating room, properly identified and placed in a supine position on the table. General anesthesia was induced, endotracheal intubation performed, IV Ancef administered. The patient was then positioned appropriately on the table and all pressure points were padded and he was secured to the table with tape. Once the positioning was performed, we proceeded  with shaving, prepping and draping the abdomen in the usual sterile fashion. Orta was placed in the bladder in a sterile manner on the field. A transverse midline incision about 2 cm above the umbilicus was made and a Veress needle introduced into the abdomen through this incision. Once the abdomen was accessed, it was confirmed using a saline drop test and the abdomen was insufflated. Once the abdomen was insufflated, additional robotic ports, 2 on the left and 1 on the right, were placed. Prior to port placement, we noted significant adhesions of the omentum to the anterior abdominal wall. Laparoscopic scissors was used to lyse these adhesions and allow right-sided port placement. A 12mm right-sided assistant port lateral to the robotic ports. Robot was then docked and surgery was commenced.   The sigmoid was adherent to the lateral pelvic sidewall and these adhesions were taken down sharply. A transverse incision was made posterior to the seminal vesicles. Denonvilliers' fascia was incised the rectum was mobilized way from the posterior prostate. The vas deferens on both sides was identified and divided with electrocautery and the seminal vesicles were carefully dissected out with the vasculature being controlled using Lapro clips. Once seminal vesicle and vas deferens were dissected all the way down to the prostate, attention was turned to bringing the bladder down off the anterior wall of the abdomen. Incisions were made lateral the medial umbilical ligaments and carried all the way up to the umbilicus and the urachus was divided horizontally. The bladder was mobilized away from the anterior abdominal wall and the the pelvic sidewalls on both sides. Endopelvic fascia was opened on each side and lateral prostate was dissected away from the levator musculature. An 0-Vicryl suture was placed to ligate the dorsal venous complex.  The anterior bladder neck was then incised at the base of the prostate and dissection  was carried through the anterior urethra to identify the Orta catheter. The Orta was then pulled out through this opening in the urethra to give upward traction on the prostate. Posterior bladder neck was then divided. There was a large median lobe.  0-vicryl traction suture was used to elevated this and facilitate dissection of posterior bladder neck. Attention was then turned to the prostatic pedicles. A bilateral nerve-sparing procedure was performed by sharply mobilizing the neurovascular bundles away from the posterior-lateral aspect of the prostate. Dissection was carried slightly wider on the right side, given location of malignancy, and indurated tameka-prostatic tissue near right base.  Margin was sent from the right base josafat-prostatic pedicle tissue near the site of presumed metastasis and this was negative for malignancy. The prostatic pedicles were then divided using clips on both sides all the way to the apex. The dorsal venous complex was then divided with cautery. The anterior urethra and apex of prostate were dissected free. Urethra was divided and prostate was free. It was placed in an endocatch bag.  Prostatic fossa was carefully inspected for hemostasis. Pelvic lymph node dissection was performed removing all juan antonio tissue between the external iliac vein, the pelvic floor, inguinal ligament up to the bifurcation of the common iliac vessels on both sides. Vesicourethral anastomosis was then performed in a standard fashion using two 3-0 V-lock sutures, the tail ends of which had been tied together. The first suture was taken at the 5 o'clock position through the bladder neck and taken through the posterior urethral plate and up the left side of the urethra and bladder to the 11 o'clock position. We then brought the right sided anastomotic suture through the urethra and up the right side of the urethra and bladder to the midline. The sutures were then tied down. A final Orta catheter was then placed  and the bladder irrigated.  The anastomosis was tested with saline and found it to be leak free. An EndoCatch bag was used to retrieve the specimen and a 19 Terence drain was placed in the pelvis. Robot was undocked and ports removed. Midline camera port incision was enlarged in a transverse fashion and the specimen retrieved. The fascia was reapproximated using interrupted figure-of-eight 0 Vicryl sutures on UR-6 needle. The skin of all the incisions was closed using running subcuticular closure with 4-0 Monocryl. Skin glue was placed.      The patient tolerated the procedure well. There were no complications. Blood loss was 250 mL. All sponge, needle and instrument counts were correct and doubly verified prior to closure. I was present and involved in the entire procedure.      Tim Faith MD  Urology  [facility]

## 2022-07-22 NOTE — BRIEF OP NOTE
[facility]    Brief Operative Note    Pre-operative diagnosis: Prostate cancer (H) [C61]  Post-operative diagnosis Same as pre-operative diagnosis    Procedure: Procedure(s):  ROBOTIC ASSISTED LAPAROSCOPIC RADICAL PROSTATECTOMY BILATERAL PELVIC LYMPHADENECTOMY, LYSIS OF ADHESIONS  Surgeon: Surgeon(s) and Role:     * Tim Faith MD - Primary  Anesthesia: General   Estimated Blood Loss: 250 mL    Drains:  18 Fr Orta, 19 Fr Terence MATHEW drain in LLQ  Specimens:   ID Type Source Tests Collected by Time Destination   1 : right base pedical margin Tissue Prostate SURGICAL PATHOLOGY EXAM Tim Faith MD 7/22/2022  3:15 PM    2 : BILATERAL PELVIC LYMPH NODES  Tissue Lymph Nodes, Pelvis, Regional SURGICAL PATHOLOGY EXAM Tim Faith MD 7/22/2022  4:29 PM    3 : PROSTATE AND SEMINAL VESICLES Tissue Prostate SURGICAL PATHOLOGY EXAM Tim Faith MD 7/22/2022  4:29 PM      Findings:   ~ 85 g prostate, grossly negative margins, water tight vesicourethral anastomosis.  Complications: None.  Implants: * No implants in log *    Post-op plan:  -Admit to Urology outpatient in a bed overnight  -Ok for toradol  -CLD, ADAT  -CBC and BMP in AM  -MATHEW removal tomorrow pending outputs  -Likely discharge home tomorrow with mariana Rebolledo MD  Urology, PGY-5  (p) [phone]

## 2022-07-22 NOTE — ANESTHESIA PREPROCEDURE EVALUATION
Anesthesia Pre-Procedure Evaluation    Patient: Ghassan Lambert   MRN: [id number] : 1950        Procedure : Procedure(s):  ROBOTIC ASSISTED LAPAROSCOPIC RADICAL PROSTATECTOMY BILATERAL PELVIC LYMPHADENECTOMY POSSIBLE OPEN          Past Medical History:   Diagnosis Date     COPD (chronic obstructive pulmonary disease) (H)      Gastroesophageal reflux disease      Gout     per H&P     Hernia, abdominal      Hyperlipidemia     per H&P     Hypertension      Seborrheic keratosis     per H&P      Past Surgical History:   Procedure Laterality Date     APPENDECTOMY       ENT SURGERY      Deviated septum repaired     HERNIA REPAIR       ORTHOPEDIC SURGERY  2019    Ankle Repair     VASECTOMY      approx 15 years ago      Allergies   Allergen Reactions     Ezetimibe Difficulty breathing and Shortness Of Breath     Pravastatin Difficulty breathing and Shortness Of Breath      Social History     Tobacco Use     Smoking status: Former Smoker     Packs/day: 0.50     Quit date:      Years since quittin.5     Smokeless tobacco: Never Used   Substance Use Topics     Alcohol use: Yes     Comment: ocassional      Wt Readings from Last 1 Encounters:   22 98 kg (216 lb)        Anesthesia Evaluation   Pt has had prior anesthetic.     History of anesthetic complications  - PONV.      ROS/MED HX  ENT/Pulmonary:     (+) tobacco use, Past use, COPD,     Neurologic:       Cardiovascular:     (+) Dyslipidemia hypertension-----    METS/Exercise Tolerance:     Hematologic:       Musculoskeletal:       GI/Hepatic:     (+) GERD, hiatal hernia,     Renal/Genitourinary:       Endo:       Psychiatric/Substance Use:       Infectious Disease:       Malignancy:   (+) Malignancy, History of Prostate.    Other:            Physical Exam    Airway        Mallampati: I   TM distance: > 3 FB   Neck ROM: full   Mouth opening: > 3 cm    Respiratory Devices and Support         Dental  no notable dental history         Cardiovascular    cardiovascular exam normal          Pulmonary   pulmonary exam normal                OUTSIDE LABS:  CBC: No results found for: WBC, HGB, HCT, PLT  BMP: No results found for: NA, POTASSIUM, CHLORIDE, CO2, BUN, CR, GLC  COAGS: No results found for: PTT, INR, FIBR  POC: No results found for: BGM, HCG, HCGS  HEPATIC: No results found for: ALBUMIN, PROTTOTAL, ALT, AST, GGT, ALKPHOS, BILITOTAL, BILIDIRECT, FREDDY  OTHER: No results found for: PH, LACT, A1C, MARY, PHOS, MAG, LIPASE, AMYLASE, TSH, T4, T3, CRP, SED    Anesthesia Plan    ASA Status:  3   NPO Status:  NPO Appropriate    Anesthesia Type: General.     - Airway: ETT   Induction: Intravenous.   Maintenance: Balanced.        Consents    Anesthesia Plan(s) and associated risks, benefits, and realistic alternatives discussed. Questions answered and patient/representative(s) expressed understanding.    - Discussed:     - Discussed with:  Patient         Postoperative Care    Pain management: IV analgesics.   PONV prophylaxis: Ondansetron (or other 5HT-3), Background Propofol Infusion     Comments:                ANUSHA VILLAGOMEZ MD

## 2022-07-22 NOTE — ANESTHESIA PROCEDURE NOTES
Airway       Patient location during procedure: OR       Procedure Start/Stop Times: 7/22/2022 12:28 PM  Staff -        Anesthesiologist:  Enoch Webb MD       CRNA: Lola Fung       Performed By: CRNA  Consent for Airway        Urgency: elective  Indications and Patient Condition       Indications for airway management: tameka-procedural       Induction type:intravenous       Mask difficulty assessment: 2 - vent by mask + OA or adjuvant +/- NMBA    Final Airway Details       Final airway type: endotracheal airway       Successful airway: ETT - single and Oral  Endotracheal Airway Details        ETT size (mm): 8.0       Cuffed: yes       Successful intubation technique: direct laryngoscopy       DL Blade Type: MAC 4       Grade View of Cords: 2       Adjucts: stylet       Position: Right       Measured from: gums/teeth       Secured at (cm): 23       Bite block used: None    Post intubation assessment        Placement verified by: capnometry, equal breath sounds and chest rise        Number of attempts at approach: 1       Number of other approaches attempted: 0       Secured with: silk tape       Ease of procedure: easy       Dentition: Intact and Unchanged    Medication(s) Administered   Medication Administration Time: 7/22/2022 12:28 PM

## 2022-07-22 NOTE — ANESTHESIA CARE TRANSFER NOTE
Patient: Ghassan Lambert    Procedure: Procedure(s):  ROBOTIC ASSISTED LAPAROSCOPIC RADICAL PROSTATECTOMY BILATERAL PELVIC LYMPHADENECTOMY, LYSIS OF ADHESIONS       Diagnosis: Prostate cancer (H) [C61]  Diagnosis Additional Information: No value filed.    Anesthesia Type:   General     Note:    Oropharynx: oropharynx clear of all foreign objects  Level of Consciousness: awake  Oxygen Supplementation: face mask  Level of Supplemental Oxygen (L/min / FiO2): 10  Independent Airway: airway patency satisfactory and stable  Dentition: dentition unchanged  Vital Signs Stable: post-procedure vital signs reviewed and stable  Report to RN Given: handoff report given  Patient transferred to: PACU    Handoff Report: Identifed the Patient, Identified the Reponsible Provider, Reviewed the pertinent medical history, Discussed the surgical course, Reviewed Intra-OP anesthesia mangement and issues during anesthesia, Set expectations for post-procedure period and Allowed opportunity for questions and acknowledgement of understanding      Vitals:  Vitals Value Taken Time   /93 07/22/22 1659   Temp     Pulse 90 07/22/22 1702   Resp 25 07/22/22 1702   SpO2 96 % 07/22/22 1702   Vitals shown include unvalidated device data.    Electronically Signed By: FABI Kaiser CRNA  July 22, 2022  5:03 PM

## 2022-07-22 NOTE — DISCHARGE INSTRUCTIONS
Activity: Ambulate often daily. No straining for bowel movements for 4 weeks. Avoid heavy lifting, nothing greater than 10 pounds (a gallon of milk) for the next 4 weeks. Ask your doctor when you can resume normal activity.          Driving: Your doctor will tell you when you can begin to drive. Make sure not to drive if you are taking take narcotics.       Wound Care:  Leave your incision open to air unless otherwise instructed. If you have wound staples they should be removed 1- 2 weeks after surgery. This will be done at your post-operative visit unless other arrangements have been made. If you do not have wound staples, then you have internal stitches that will dissolve with time. Over these may be white strips called  steri-strips  these should remain in place until they have mostly peeled off, after which they can be removed.     Orta Catheter: You will go home with a Orta and will be taught how to care for this  before you are discharged. You will be taught how to use a leg bag. The leg bag makes  it easier to walk around and is very useful when you are going out of the house. Empty the leg bag when it is half full. You may shower if you have a Orta.     Drain:  If you have a MATHEW drain, empty and record the drain output daily and bring this information to your doctor s appointment. Do not bath if you have a drain in place, but you may shower. Your drain may be in place for 3-7 days. Your doctor will decide when it will be removed.     Shower: You may shower when you get home from the hospital. You may remove band aids if you have them but if you have steri-strips then leave them in place. If you have a drain do not let the water hit the drain site. PAT dry all incisions, DO NOT RUB.               Infection: Report the following warning signs of infection to your doctor immediately:               A temperature of 101.0 degrees Fahrenheit or greater               Redness, swelling, or drainage of the incision  site               Sudden onset of tenderness or warmth around the incision               Foul odor from the incision site    Pain: It is normal for you to have minor discomfort after surgery. If there is any  significant change in your condition call your doctor.    Bowel Function: You may experience constipation after surgery. Do not use suppositories or enemas. Eat a well balanced diet and drink plenty of fluids. You may take the stool softener, Colace (100 mg twice a day) for 2-3 weeks or until you stop taking narcotic pain medications. You may stop this medication if you have diarrhea. This can be purchased at your drug store. Unless you have poor renal function and high potassium, eating prunes, drinking prune juice, or using a laxative (such as Milk of Magnesia) is acceptable to help you return to normal bowel function.    Dehydration: Drink 6-8 glasses of water a day to avoid dehydration. You will know you are dehydrated if you feel thirsty or urinate infrequently. Your urine will also appear dark.    Miscellaneous:                If you experience shortness of breath, difficulty breathing, or pain in your legs- please go to the nearest Emergency Department.               You may experience pink colored urine.                If you experience persistent abdominal cramping, bloating, nausea, vomiting, or constipation, this may indicate a bowel obstruction or ileus. If the symptoms are mild, you may restrict dietary intake to only liquids and avoid solid food. If the symptoms are severe or persist beyond 24 hours, you must call your doctor for advice.     Follow up: Follow-up is routinely in 7-14 days for peña removal.

## 2022-07-23 VITALS
SYSTOLIC BLOOD PRESSURE: 113 MMHG | TEMPERATURE: 98.5 F | WEIGHT: 223.9 LBS | RESPIRATION RATE: 20 BRPM | OXYGEN SATURATION: 94 % | BODY MASS INDEX: 31.35 KG/M2 | HEIGHT: 71 IN | HEART RATE: 86 BPM | DIASTOLIC BLOOD PRESSURE: 63 MMHG

## 2022-07-23 LAB
ANION GAP SERPL CALCULATED.3IONS-SCNC: 7 MMOL/L (ref 3–14)
BUN SERPL-MCNC: 18 MG/DL (ref 7–30)
CALCIUM SERPL-MCNC: 7.7 MG/DL (ref 8.5–10.1)
CHLORIDE BLD-SCNC: 108 MMOL/L (ref 94–109)
CO2 SERPL-SCNC: 26 MMOL/L (ref 20–32)
CREAT SERPL-MCNC: 1.09 MG/DL (ref 0.66–1.25)
ERYTHROCYTE [DISTWIDTH] IN BLOOD BY AUTOMATED COUNT: 13.2 % (ref 10–15)
GFR SERPL CREATININE-BSD FRML MDRD: 72 ML/MIN/1.73M2
GLUCOSE BLD-MCNC: 131 MG/DL (ref 70–99)
HCT VFR BLD AUTO: 35.4 % (ref 40–53)
HGB BLD-MCNC: 12 G/DL (ref 13.3–17.7)
MCH RBC QN AUTO: 31.7 PG (ref 26.5–33)
MCHC RBC AUTO-ENTMCNC: 33.9 G/DL (ref 31.5–36.5)
MCV RBC AUTO: 94 FL (ref 78–100)
PLATELET # BLD AUTO: 144 10E3/UL (ref 150–450)
POTASSIUM BLD-SCNC: 3.7 MMOL/L (ref 3.4–5.3)
RBC # BLD AUTO: 3.78 10E6/UL (ref 4.4–5.9)
SODIUM SERPL-SCNC: 141 MMOL/L (ref 133–144)
WBC # BLD AUTO: 10.4 10E3/UL (ref 4–11)

## 2022-07-23 PROCEDURE — 80048 BASIC METABOLIC PNL TOTAL CA: CPT | Performed by: STUDENT IN AN ORGANIZED HEALTH CARE EDUCATION/TRAINING PROGRAM

## 2022-07-23 PROCEDURE — 85027 COMPLETE CBC AUTOMATED: CPT | Performed by: STUDENT IN AN ORGANIZED HEALTH CARE EDUCATION/TRAINING PROGRAM

## 2022-07-23 PROCEDURE — 250N000013 HC RX MED GY IP 250 OP 250 PS 637: Performed by: STUDENT IN AN ORGANIZED HEALTH CARE EDUCATION/TRAINING PROGRAM

## 2022-07-23 PROCEDURE — 250N000011 HC RX IP 250 OP 636: Performed by: STUDENT IN AN ORGANIZED HEALTH CARE EDUCATION/TRAINING PROGRAM

## 2022-07-23 PROCEDURE — 250N000013 HC RX MED GY IP 250 OP 250 PS 637: Performed by: UROLOGY

## 2022-07-23 PROCEDURE — 36415 COLL VENOUS BLD VENIPUNCTURE: CPT | Performed by: STUDENT IN AN ORGANIZED HEALTH CARE EDUCATION/TRAINING PROGRAM

## 2022-07-23 PROCEDURE — 258N000003 HC RX IP 258 OP 636: Performed by: STUDENT IN AN ORGANIZED HEALTH CARE EDUCATION/TRAINING PROGRAM

## 2022-07-23 RX ADMIN — KETOROLAC TROMETHAMINE 15 MG: 30 INJECTION, SOLUTION INTRAMUSCULAR at 00:00

## 2022-07-23 RX ADMIN — KETOROLAC TROMETHAMINE 15 MG: 30 INJECTION, SOLUTION INTRAMUSCULAR at 05:53

## 2022-07-23 RX ADMIN — ALLOPURINOL 300 MG: 300 TABLET ORAL at 08:11

## 2022-07-23 RX ADMIN — SIMVASTATIN 20 MG: 20 TABLET, FILM COATED ORAL at 08:11

## 2022-07-23 RX ADMIN — SODIUM CHLORIDE: 9 INJECTION, SOLUTION INTRAVENOUS at 03:56

## 2022-07-23 RX ADMIN — PANTOPRAZOLE SODIUM 40 MG: 20 TABLET, DELAYED RELEASE ORAL at 08:12

## 2022-07-23 RX ADMIN — POLYETHYLENE GLYCOL 3350 17 G: 17 POWDER, FOR SOLUTION ORAL at 08:11

## 2022-07-23 RX ADMIN — DICLOXACILLIN SODIUM 500 MG: 500 CAPSULE ORAL at 08:17

## 2022-07-23 RX ADMIN — ACETAMINOPHEN 650 MG: 325 TABLET ORAL at 08:11

## 2022-07-23 RX ADMIN — SENNOSIDES AND DOCUSATE SODIUM 1 TABLET: 50; 8.6 TABLET ORAL at 08:12

## 2022-07-23 RX ADMIN — ACETAMINOPHEN 650 MG: 325 TABLET ORAL at 02:45

## 2022-07-23 NOTE — PLAN OF CARE
Patient arrived from PACU at 1845. VSS on 2L of O2. Pain managed with ice on abdomen, declined needing PO pain meds at this time. Orta cath in place, red urine. Jimmy site had sanguinous drainage, emptied, to bulb suction. IV fluids infusing.

## 2022-07-23 NOTE — PLAN OF CARE
Patient AOX4. VSS on RA. Ambulating SBA in halls. Pain managed with scheduled tylenol. Port sites CDI with ice in place. MATHEW drain removed by provider, dressing applied. Voiding with peña catheter, pink tinged urine. BS+ and passing gas. Saline locked.     Patient discharged home with spouse this afternoon. All questions and concerns related to discharge were addressed. All patient belongings, Rx an AVS sent home with patient. Patient was discharged home with a packet on peña catheters, an extra leg bag and night bag, as well as supplies to keep MATHEW drain site covered. He demonstrated how to properly change between bags and keep peña cathter clean as well as empty the bag.

## 2022-07-23 NOTE — PLAN OF CARE
POD 1 from prostatectomy with bilateral pelvic lymphadenectomy. A&O x4. . Bowel sounds hypoactive, passing flatus, tolerating clear liquid diet. VSS  on 1L O2.  MATHEW on lateral abdomen with bloody output. Peña with pinkish output. Up with stand by. C/o moderate pain, decreased with Toradol and tylenol. Pt scoring green on the Aggression Stop Light Tool. Plan to discharge pt home today with peña.

## 2022-07-23 NOTE — ANESTHESIA POSTPROCEDURE EVALUATION
Patient: Ghassan Lambert    Procedure: Procedure(s):  ROBOTIC ASSISTED LAPAROSCOPIC RADICAL PROSTATECTOMY BILATERAL PELVIC LYMPHADENECTOMY, LYSIS OF ADHESIONS       Anesthesia Type:  General    Note:  Disposition: Inpatient   Postop Pain Control: Uneventful            Sign Out: Well controlled pain   PONV: No   Neuro/Psych: Uneventful            Sign Out: Acceptable/Baseline neuro status   Airway/Respiratory: Uneventful            Sign Out: Acceptable/Baseline resp. status   CV/Hemodynamics: Uneventful            Sign Out: Acceptable CV status   Other NRE: NONE   DID A NON-ROUTINE EVENT OCCUR? No           Last vitals:  Vitals Value Taken Time   /87 07/22/22 1820   Temp 36.6  C (97.9  F) 07/22/22 1740   Pulse 87 07/22/22 1824   Resp 20 07/22/22 1824   SpO2 97 % 07/22/22 1825   Vitals shown include unvalidated device data.    Electronically Signed By: Maxx Lockhart MD  July 22, 2022  8:17 PM

## 2022-07-23 NOTE — PROGRESS NOTES
[facility] Progress Note          Assessment and Plan:     Active Problems:    Prostate cancer (H)    Assessment: POD1 s/p RARP and PLND doing well    Plan:   -Drain removed  -Discharge today with peña catheter  -TOV and postop appts scheduled    Darnell Stanley MD  [facility]  Pager: [phone]               Interval History:     doing well; no cp, sob, n/v/d.  Minimal abd pain. Peña with clear urine    Tolerating PO and ambulating.              Review of Systems:     The 5 point Review of Systems is negative other than noted in the HPI             Medications:     Current Facility-Administered Medications Ordered in Epic   Medication Dose Route Frequency Last Rate Last Admin     acetaminophen (TYLENOL) tablet 650 mg  650 mg Oral Q6H   650 mg at 07/23/22 0245     allopurinol (ZYLOPRIM) tablet 300 mg  300 mg Oral Daily         dicloxacillin (DYNAPEN) capsule 500 mg  500 mg Oral BID   500 mg at 07/22/22 2208     HYDROmorphone (DILAUDID) injection 0.2 mg  0.2 mg Intravenous Q2H PRN        Or     HYDROmorphone (DILAUDID) injection 0.4 mg  0.4 mg Intravenous Q2H PRN         ketorolac (TORADOL) injection 15 mg  15 mg Intravenous Q6H   15 mg at 07/23/22 0553     lidocaine (LMX4) cream   Topical Q1H PRN         lidocaine (XYLOCAINE) 5 % ointment   Topical 4x Daily PRN         lidocaine 1 % 0.1-1 mL  0.1-1 mL Other Q1H PRN         [Held by provider] lisinopril (ZESTRIL) tablet 10 mg  10 mg Oral Daily         naloxone (NARCAN) injection 0.2 mg  0.2 mg Intravenous Q2 Min PRN        Or     naloxone (NARCAN) injection 0.4 mg  0.4 mg Intravenous Q2 Min PRN        Or     naloxone (NARCAN) injection 0.2 mg  0.2 mg Intramuscular Q2 Min PRN        Or     naloxone (NARCAN) injection 0.4 mg  0.4 mg Intramuscular Q2 Min PRN         neomycin-bacitracin-polymyxin (NEOSPORIN) ointment   Topical 4x Daily PRN         ondansetron (ZOFRAN ODT) ODT tab 4 mg  4 mg Oral Q6H PRN        Or     ondansetron (ZOFRAN) injection  4 mg  4 mg Intravenous Q6H PRN         opium-belladonna (B&O SUPPRETTES) 30-16.2 MG per suppository 1 suppository  30 mg Rectal TID PRN         oxybutynin ER (DITROPAN XL) 24 hr tablet 5 mg  5 mg Oral At Bedtime   5 mg at 07/22/22 2123     oxyCODONE (ROXICODONE) tablet 5 mg  5 mg Oral Q4H PRN        Or     oxyCODONE (ROXICODONE) tablet 10 mg  10 mg Oral Q4H PRN         pantoprazole (PROTONIX) EC tablet 40 mg  40 mg Oral BID         polyethylene glycol (MIRALAX) Packet 17 g  17 g Oral Daily   17 g at 07/22/22 1944     prochlorperazine (COMPAZINE) injection 5 mg  5 mg Intravenous Q6H PRN        Or     prochlorperazine (COMPAZINE) tablet 5 mg  5 mg Oral Q6H PRN         senna-docusate (SENOKOT-S/PERICOLACE) 8.6-50 MG per tablet 1 tablet  1 tablet Oral BID   1 tablet at 07/22/22 2123     simvastatin (ZOCOR) tablet 20 mg  20 mg Oral Daily         sodium chloride (PF) 0.9% PF flush 3 mL  3 mL Intracatheter Q8H   3 mL at 07/23/22 0247     sodium chloride (PF) 0.9% PF flush 3 mL  3 mL Intracatheter q1 min prn         sodium chloride 0.9% infusion   Intravenous Continuous 100 mL/hr at 07/23/22 0356 New Bag at 07/23/22 0356     Current Outpatient Medications Ordered in Epic   Medication     oxybutynin ER (DITROPAN XL) 5 MG 24 hr tablet     oxyCODONE (ROXICODONE) 5 MG tablet     senna-docusate (SENOKOT-S/PERICOLACE) 8.6-50 MG tablet                  Physical Exam:   Vitals were reviewed  Patient Vitals for the past 8 hrs:   BP Temp Temp src Pulse Resp SpO2   07/23/22 0744 113/63 98.5  F (36.9  C) Oral 86 20 95 %   07/23/22 0006 125/86 98.1  F (36.7  C) Oral 92 18 92 %       Intake/Output Summary (Last 24 hours) at 7/23/2022 0802  Last data filed at 7/23/2022 0556  Gross per 24 hour   Intake 3308 ml   Output 1685 ml   Net 1623 ml      MATHEW Output 125/60 ml  Orta: 700/550 ml    GEN: NAD, lying in bed  Lungs: Nonlabored respirations  Abdomen: Soft, nontender, nondistended, incisions not significant drainage through dressing.  MATHEW  drain serosanguineous  : Orta clear with very light pink-tinged         Data:      Latest Reference Range & Units 07/23/22 08:31   Sodium 133 - 144 mmol/L 141   Potassium 3.4 - 5.3 mmol/L 3.7   Chloride 94 - 109 mmol/L 108   Carbon Dioxide 20 - 32 mmol/L 26   Urea Nitrogen 7 - 30 mg/dL 18   Creatinine 0.66 - 1.25 mg/dL 1.09   GFR Estimate >60 mL/min/1.73m2 72   Calcium 8.5 - 10.1 mg/dL 7.7 (L)   Anion Gap 3 - 14 mmol/L 7   Glucose 70 - 99 mg/dL 131 (H)   WBC 4.0 - 11.0 10e3/uL 10.4   Hemoglobin 13.3 - 17.7 g/dL 12.0 (L)   Hematocrit 40.0 - 53.0 % 35.4 (L)   Platelet Count 150 - 450 10e3/uL 144 (L)   RBC Count 4.40 - 5.90 10e6/uL 3.78 (L)   MCV 78 - 100 fL 94   MCH 26.5 - 33.0 pg 31.7   MCHC 31.5 - 36.5 g/dL 33.9   RDW 10.0 - 15.0 % 13.2   (L): Data is abnormally low  (H): Data is abnormally high

## 2022-07-26 LAB
PATH REPORT.COMMENTS IMP SPEC: ABNORMAL
PATH REPORT.COMMENTS IMP SPEC: ABNORMAL
PATH REPORT.COMMENTS IMP SPEC: YES
PATH REPORT.FINAL DX SPEC: ABNORMAL
PATH REPORT.GROSS SPEC: ABNORMAL
PATH REPORT.INTRAOP OBS SPEC DOC: ABNORMAL
PATH REPORT.MICROSCOPIC SPEC OTHER STN: ABNORMAL
PATH REPORT.RELEVANT HX SPEC: ABNORMAL
PATHOLOGY SYNOPTIC REPORT: ABNORMAL
PHOTO IMAGE: ABNORMAL

## 2022-07-29 ENCOUNTER — ALLIED HEALTH/NURSE VISIT (OUTPATIENT)
Dept: UROLOGY | Facility: CLINIC | Age: 72
End: 2022-07-29
Payer: MEDICARE

## 2022-07-29 ENCOUNTER — NURSE TRIAGE (OUTPATIENT)
Dept: NURSING | Facility: CLINIC | Age: 72
End: 2022-07-29

## 2022-07-29 DIAGNOSIS — C61 PROSTATE CANCER (H): Primary | ICD-10-CM

## 2022-07-29 PROCEDURE — 99207 PR NO CHARGE NURSE ONLY: CPT

## 2022-07-29 NOTE — TELEPHONE ENCOUNTER
Nurse Triage SBAR    Is this a 2nd Level Triage?  Yes     Situation:   Chest congestion, cough, wheezing    Background/Assessment:     Pt reporting the last few days feeling full in the left side of his chest.     It bothers him more when he is laying down.  I could hear wheezing on the phone while I was talking to the Pt.     When he coughs it sound very wet and congested.  Pt has the starting of COPD and has inhalers.   No fever,  sinus congestion,  or sore throat noted.     I suggested an Urgent Care close to there home for a chest x-ray and a visit for Pt care.         Family did not want to take the Pt out due all of the issues with Covid.     I suggested they call there Primary Care Provider for further assistance to see if they could get a video chat and medication sent to pharmacy for Pt care.  They go through [facility] in the [facility] in [facility].        They were going to call there Primary Care Provider for further assistance.    No further action needed regarding this concern.    Vikki Chamorro RN  Central Triage Red Flags/Med Refills    Protocol Recommended Disposition:    See Today Or Tomorrow In Office      Reason for Disposition    Patient wants to be seen    Additional Information    Negative: Bluish (or gray) lips or face    Negative: Severe difficulty breathing (e.g., struggling for each breath, speaks in single words)    Negative: Rapid onset of cough and has hives    Negative: Coughing started suddenly after medicine, an allergic food or bee sting    Negative: Difficulty breathing after exposure to flames, smoke, or fumes    Negative: Sounds like a life-threatening emergency to the triager    Negative: Previous asthma attacks and this feels like asthma attack    Negative: Chest pain present when not coughing    Negative: Difficulty breathing    Negative: Passed out (i.e., fainted, collapsed and was not responding)    Negative: Patient sounds very sick or weak to the triager     Negative: Coughed up > 1 tablespoon (15 ml) blood (Exception: blood-tinged sputum)    Negative: Fever > 103 F (39.4 C)    Negative: Fever > 101 F (38.3 C) and over 60 years of age    Negative: Fever > 100.0 F (37.8 C) and has diabetes mellitus or a weak immune system (e.g., HIV positive, cancer chemotherapy, organ transplant, splenectomy, chronic steroids)    Negative: Fever > 100.0 F (37.8 C) and bedridden (e.g., nursing home patient, stroke, chronic illness, recovering from surgery)    Negative: Increasing ankle swelling    Negative: Wheezing is present    Negative: SEVERE coughing spells (e.g., whooping sound after coughing, vomiting after coughing)    Negative: Coughing up dorene-colored (reddish-brown) or blood-tinged sputum    Negative: Fever present > 3 days (72 hours)    Negative: Fever returns after gone for over 24 hours and symptoms worse or not improved    Negative: Using nasal washes and pain medicine > 24 hours and sinus pain persists    Negative: Known COPD or other severe lung disease (i.e., bronchiectasis, cystic fibrosis, lung surgery) and worsening symptoms (i.e., increased sputum purulence or amount, increased breathing difficulty)    Negative: Continuous (nonstop) coughing interferes with work or school and no improvement using cough treatment per Care Advice    Protocols used: COUGH-A-OH

## 2022-07-29 NOTE — PROGRESS NOTES
Ghassan RIKKI Lambert comes into clinic today at the request of  Ordering Provider for Catheter Removal.  This service provided today was under the supervising provider of the day , who was available if needed.  Catheter removed with ease,instriuctions gone over will follow up with as planned with .  Katie Muir LPN

## 2022-08-04 ENCOUNTER — OFFICE VISIT (OUTPATIENT)
Dept: UROLOGY | Facility: CLINIC | Age: 72
End: 2022-08-04
Payer: MEDICARE

## 2022-08-04 VITALS
SYSTOLIC BLOOD PRESSURE: 116 MMHG | WEIGHT: 212 LBS | HEIGHT: 72 IN | DIASTOLIC BLOOD PRESSURE: 70 MMHG | BODY MASS INDEX: 28.71 KG/M2

## 2022-08-04 DIAGNOSIS — N52.31 ERECTILE DYSFUNCTION AFTER RADICAL PROSTATECTOMY: ICD-10-CM

## 2022-08-04 DIAGNOSIS — C61 PROSTATE CANCER (H): Primary | ICD-10-CM

## 2022-08-04 PROCEDURE — 99024 POSTOP FOLLOW-UP VISIT: CPT | Performed by: UROLOGY

## 2022-08-04 RX ORDER — SILDENAFIL CITRATE 20 MG/1
20 TABLET ORAL DAILY PRN
Qty: 30 TABLET | Refills: 11 | Status: SHIPPED | OUTPATIENT
Start: 2022-08-04 | End: 2023-08-17

## 2022-08-04 RX ORDER — SILDENAFIL CITRATE 20 MG/1
20 TABLET ORAL DAILY PRN
Qty: 30 TABLET | Refills: 11 | Status: SHIPPED | OUTPATIENT
Start: 2022-08-04 | End: 2022-08-04

## 2022-08-04 ASSESSMENT — PAIN SCALES - GENERAL: PAINLEVEL: NO PAIN (0)

## 2022-08-04 NOTE — PROGRESS NOTES
"  CHIEF COMPLAINT   It was my pleasure to see Ghassan Lambert who is a 71 year old male for follow-up of Prostate Cancer.      HPI   Ghassan Lambert is a very pleasant 71 year old male who presents with a history of prostate cancer. Underwent RALP 7/2022 with Dileep 3+4=7 disease, stage pT3bN0 with negative margins. Recovering without complication.     RALP 7/22/2022    SPECIMEN   Procedure  Radical prostatectomy    TUMOR   Histologic Type  Acinar adenocarcinoma    Histologic Grade     Grade  Grade group 2 (Ches[place]old Score 3 + 4 = 7)    Minor Tertiary Pattern 5 (less than 5%)  Not applicable    Percentage of Pattern 4  31 - 40%    Intraductal Carcinoma (IDC)  Not identified    Cribriform Glands  Present    Treatment Effect  No known presurgical therapy    TUMOR QUANTITATION     Estimated Percentage of Prostate Involved by Tumor  11 - 20%    Extraprostatic Extension (EPE)  Present, focal    Location of Extraprostatic Extension  Right posterior    Urinary Bladder Neck Invasion  Not identified    Seminal Vesicle Invasion  Present, right    Lymphovascular Invasion  Not Identified    Perineural Invasion  Present    MARGINS   Margin Status  All margins negative for invasive carcinoma    REGIONAL LYMPH NODES   Regional Lymph Node Status  All regional lymph nodes negative for tumor    Number of Lymph Nodes Examined  4    PATHOLOGIC STAGE CLASSIFICATION (pTNM, AJCC 8th Edition)   Reporting of pT, pN, and (when applicable) pM categories is based on information available to the pathologist at the time the report is issued. As per the AJCC (Chapter 1, 8th Ed.) it is the managing physician s responsibility to establish the final pathologic stage based upon all pertinent information, including but potentially not limited to this pathology report.   Primary Tumor (pT)  pT3b    pN Category  pN0      PHYSICAL EXAM  Patient is a 71 year old  male   Vitals: Blood pressure 116/70, height 1.816 m (5' 11.5\"), weight 96.2 kg (212 " lb).  General Appearance Adult: Body mass index is 29.16 kg/m .  Alert, no acute distress, oriented  Lungs: no respiratory distress, or pursed lip breathing  Abdomen: soft, nontender, no organomegaly or masses  Incisions healing well  Back: no CVAT  Neuro: Alert, oriented, speech and mentation normal  Psych: affect and mood normal    ASSESSMENT and PLAN  71 year old male with stage pT3bN0, Dileep 3+4=7 prostate cancer, s/p RALP completed 7/22/2022. Negative margins. Recovering well. Orta removed last week. Discussed expectations for recovery and ongoing activity restrictions. We discussed his pathology in detail today. We discussed the importance of ongoing PSA surveillance and risk of recurrence.  We discussed risks and side effects of sildenafil and this was sent to his pharmacy today.     - Pelvic floor PT referral  - Follow-up 3 months with PSA  - Sildenafil    15 minutes spent on the date of the encounter doing chart review, history and exam, documentation and further activities as noted above.    Tim Faith MD  Urology  [facility]

## 2022-10-13 ENCOUNTER — THERAPY VISIT (OUTPATIENT)
Dept: PHYSICAL THERAPY | Facility: CLINIC | Age: 72
End: 2022-10-13
Attending: UROLOGY
Payer: MEDICARE

## 2022-10-13 DIAGNOSIS — C61 PROSTATE CANCER (H): ICD-10-CM

## 2022-10-13 DIAGNOSIS — N39.3 STRESS INCONTINENCE, MALE: ICD-10-CM

## 2022-10-13 DIAGNOSIS — M99.05 SOMATIC DYSFUNCTION OF PELVIC REGION: Primary | ICD-10-CM

## 2022-10-13 PROCEDURE — 97535 SELF CARE MNGMENT TRAINING: CPT | Mod: GP

## 2022-10-13 PROCEDURE — 97110 THERAPEUTIC EXERCISES: CPT | Mod: GP

## 2022-10-13 PROCEDURE — 97161 PT EVAL LOW COMPLEX 20 MIN: CPT | Mod: GP

## 2022-10-13 NOTE — PROGRESS NOTES
Physical Therapy Initial Evaluation  Subjective:  Carmelo is s/p RALP 7/22/22. He reports having a good urine stream and is fully emptying the bladder. Wore a pad a few weeks after cath removal but no longer wearing protection.  He will leak a little urine (small drops) with sit to stand or coughing. Denies urinary urgency. No abdominal pain. Has not been able to get erection since surgery but has been trying. He is taking sildenafil.     Exercise: Walking currently 2.5 mi, 4x/wk, Would like to get back into ex w/ - lots of body weight, TRX, treadmill  Goals:     Urination:  Do you leak on the way to the bathroom or with a strong urge to void? n   Do you leak with cough,sneeze, jumping, running? occ  Any other activities that cause leaking?  n  Do you have triggers that make you feel you can't wait to go to the bathroom?  What are they? n  Type of pad and number used per day? 0  When you leak what is the amount? sm  How long can you delay the need to urinate? Hasn't been an issue  Frequency of daytime urination: 4-5 times a day  Frequency of nighttime urination:2  Can you stop the flow of urine when on the toilet? y  Is the volume of urine passed usually:  (8sec rule= 250ml with average bladder storing 400-600ml) med  Do you strain to pass urine? n  Do you have a slow or hesitant urinary stream? n  Do you have difficulty initiating the urine stream? n  Is urination painful? n  How many bladder infections have you had in last 12 months? 0   Fluid intake(one glass is 8oz or one cup) 2 glasses/day, 2-3 caffinated glasses/day  0 alcohol glasses/day.    Bowel habits:  Frequency of bowel movements? 1 times a d  Consistancy of stool? Soft formed-hard  Do you ignore the urge to defecate? n  Do you strain to pass stool? occ    Pelvic Pain:  Do you have any pelvic pain? n  Are you sexually active? Not since surg  Surgery: Hx of appendectomy, umbilical hernia repair, RALP    Have you ever been worried for your physical  safety? n  Have you practiced the PF(kegel) exercises for 4 or more weeks?n    The history is provided by the patient. No  was used.   Patient Health History  Ghassan Lambert being seen for Follow up post prostate removal.          Pain is reported as 1/10 on pain scale.  General health as reported by patient is good.       Medical allergies: none.   Surgeries include:  Cancer surgery. Other surgery history details: Gall bladder, hernia, appendix.    Current medications:  High blood pressure medication.    Current occupation is Retired.                                       Objective:  System         Lumbar/SI Evaluation  ROM:  AROM Lumbar: normal    Strength: poor TA contraction and performs valsalva instead                                            Pelvic Dysfunction Evaluation:        Flexibility:      Tightness not present at:  Hamstrings; Piriformis or Gluteals    Abdominal Wall:  Abdominal wall pelvic: doming noted above umbilicus and pt tends to valsalva..   Diastasis Recti:  Present    Scar Mobility:  Well-healing incisions, far 2 on right and appendectomy site w/ mod scar restrictions, no pain        External Assessment:  External assessment pelvic: Initial glute substitutions and breath holding, able to correct w/ cues.  Skin Condition:  Normal    Bearing Down/Coughing:  Normal      Muscle Contraction/Perineal Mobility:  Elevation and urogential triangle descent and substitution    SEMG Biofeedback:    Equipment:  [employer] MR 20    Suraface electrode placement--Perianal:  Y  Baseline EMG PM:  1.3 uV supine, 0.1 uV sitting    Peak pelvic muscle contraction:  15 uV supine, 4 uV sitting  Sustained contraction:  10 uV supine, 3 uV sitting. Can sustain 7 sec hold supine and 4 sec sitting  EMG interpretation to fatigue:  3-5 seconds  Position:  Supine and sitting          Hip Evaluation  Hip PROM:  Hip PROM:  Left Hip:    Normal  Right Hip:  Normal                          Hip Strength:   Hip Strength:    Left:    Normal  Right:  Normal                                         General     ROS    Assessment/Plan:    Patient is a 72 year old male with pelvic complaints.    Patient has the following significant findings with corresponding treatment plan.                Diagnosis 1:  MYKEL s/p RALP  Decreased strength - therapeutic exercise and therapeutic activities  Impaired muscle performance - biofeedback, electric stimulation, neuro re-education and home program  Decreased function - therapeutic activities, home program and functional performance testing  Diagnosis 2:  DARIEN   Decreased strength - therapeutic exercise and therapeutic activities  Impaired muscle performance - biofeedback, electric stimulation and neuro re-education  Decreased function - therapeutic activities, home program and functional performance testing    Therapy Evaluation Codes:   1) History comprised of:   Personal factors that impact the plan of care:      None.    Comorbidity factors that impact the plan of care are:      Bowel/bladder changes.     Medications impacting care: High blood pressure.  2) Examination of Body Systems comprised of:   Body structures and functions that impact the plan of care:      Head, Hip, Knee, Lumbar spine, Pelvis and Thoracic Spine.   Activity limitations that impact the plan of care are:      Bending, Lifting, Running, Sports, Squatting/kneeling, Hector and Urinary incontinence.  3) Clinical presentation characteristics are:   Stable/Uncomplicated.  4) Decision-Making    Low complexity using standardized patient assessment instrument and/or measureable assessment of functional outcome.  Cumulative Therapy Evaluation is: Low complexity.    Previous and current functional limitations:  (See Goal Flow Sheet for this information)    Short term and Long term goals: (See Goal Flow Sheet for this information)     Communication ability:  Patient appears to be able to clearly communicate and  understand verbal and written communication and follow directions correctly.  Treatment Explanation - The following has been discussed with the patient:   RX ordered/plan of care  Anticipated outcomes  Possible risks and side effects  This patient would benefit from PT intervention to resume normal activities.   Rehab potential is good.    Frequency:  2 X a month, once daily  Duration:  for 3 months  Discharge Plan:  Achieve all LTG.  Independent in home treatment program.  Reach maximal therapeutic benefit.    Please refer to the daily flowsheet for treatment today, total treatment time and time spent performing 1:1 timed codes.     Juilet Ziegler, PT, DPT

## 2022-10-13 NOTE — PROGRESS NOTES
[facility]    OUTPATIENT Physical Therapy ORTHOPEDIC EVALUATION  PLAN OF TREATMENT FOR OUTPATIENT REHABILITATION  (COMPLETE FOR INITIAL CLAIMS ONLY)  Patient's Last Name, First Name, M.I.  YOB: 1950  Ghassan Lambert    Provider s Name:  [facility]   Medical Record No.  [id number]   Start of Care Date:  10/13/22   Onset Date:  07/22/22    Treatment Diagnosis:  MYKEL s/p RALP Medical Diagnosis:     Prostate cancer (H)  Somatic dysfunction of pelvic region  Stress incontinence, male       Goals:     10/13/22 0700   Urinary Leakage   Previous Functional Level No problems   Current Functional Level Leakage with sit to stand   STG Target Performance Decrease urinary leakage episodes in a week to   Performance Level No leaking w/ sit to stand   Rationale continence throughout the day;for healthy hygiene and to prevent skin breakdown   Due Date 11/10/22   LTG Target Performance Decrease leakage with   Performance Level Return to 30 min exercise and no leakage w/ any activities   Rationale continence throughout the day;for healthy hygiene and to prevent skin breakdown   Due Date 01/05/23       Therapy Frequency:  2x/mo  Predicted Duration of Therapy Intervention:  3 mo    TIKA SIDHU, PT                 I CERTIFY THE NEED FOR THESE SERVICES FURNISHED UNDER        THIS PLAN OF TREATMENT AND WHILE UNDER MY CARE     (Physician co-signature of this document indicates review and certification of the therapy plan).                     Certification Date From:  10/13/22   Certification Date To:  01/05/23    Referring Provider:  Tim Faith    Initial Assessment        See Epic Evaluation SOC Date: 10/13/22

## 2022-10-22 ENCOUNTER — HEALTH MAINTENANCE LETTER (OUTPATIENT)
Age: 72
End: 2022-10-22

## 2022-11-10 ENCOUNTER — OFFICE VISIT (OUTPATIENT)
Dept: UROLOGY | Facility: CLINIC | Age: 72
End: 2022-11-10
Payer: COMMERCIAL

## 2022-11-10 VITALS
WEIGHT: 225 LBS | BODY MASS INDEX: 31.5 KG/M2 | OXYGEN SATURATION: 96 % | SYSTOLIC BLOOD PRESSURE: 150 MMHG | HEIGHT: 71 IN | HEART RATE: 74 BPM | DIASTOLIC BLOOD PRESSURE: 90 MMHG

## 2022-11-10 DIAGNOSIS — C61 PROSTATE CANCER (H): Primary | ICD-10-CM

## 2022-11-10 LAB — PSA SERPL-MCNC: <0.04 UG/L (ref 0–4)

## 2022-11-10 PROCEDURE — 99213 OFFICE O/P EST LOW 20 MIN: CPT | Performed by: UROLOGY

## 2022-11-10 PROCEDURE — 36415 COLL VENOUS BLD VENIPUNCTURE: CPT | Performed by: UROLOGY

## 2022-11-10 PROCEDURE — 84153 ASSAY OF PSA TOTAL: CPT | Performed by: UROLOGY

## 2022-11-10 ASSESSMENT — PAIN SCALES - GENERAL: PAINLEVEL: NO PAIN (0)

## 2022-11-10 NOTE — PROGRESS NOTES
CHIEF COMPLAINT   It was my pleasure to see Ghassan Lambert who is a 72 year old male for follow-up of Prostate Cancer.      HPI  Ghassan Lambert is a very pleasant 72 year old male who presents with a history of prostate cancer. Underwent RALP 7/2022 with Dileep 3+4=7 disease, stage pT3bN0 with negative margins. Recovering without complication. Excellent return of continence. Not wearing pads. No significant return of erectile function to date.    PSA  11/10/2022 - <0.04     RALP 7/22/2022          SPECIMEN   Procedure   Radical prostatectomy    TUMOR   Histologic Type   Acinar adenocarcinoma    Histologic Grade       Grade   Grade group 2 ([place] Score 3 + 4 = 7)    Minor Tertiary Pattern 5 (less than 5%)   Not applicable    Percentage of Pattern 4   31 - 40%    Intraductal Carcinoma (IDC)   Not identified    Cribriform Glands   Present    Treatment Effect   No known presurgical therapy    TUMOR QUANTITATION       Estimated Percentage of Prostate Involved by Tumor   11 - 20%    Extraprostatic Extension (EPE)   Present, focal    Location of Extraprostatic Extension   Right posterior    Urinary Bladder Neck Invasion   Not identified    Seminal Vesicle Invasion   Present, right    Lymphovascular Invasion   Not Identified    Perineural Invasion   Present    MARGINS   Margin Status   All margins negative for invasive carcinoma    REGIONAL LYMPH NODES   Regional Lymph Node Status   All regional lymph nodes negative for tumor    Number of Lymph Nodes Examined   4    PATHOLOGIC STAGE CLASSIFICATION (pTNM, AJCC 8th Edition)   Reporting of pT, pN, and (when applicable) pM categories is based on information available to the pathologist at the time the report is issued. As per the AJCC (Chapter 1, 8th Ed.) it is the managing physician s responsibility to establish the final pathologic stage based upon all pertinent information, including but potentially not limited to this pathology report.   Primary Tumor (pT)    "pT3b    pN Category   pN0       PHYSICAL EXAM  Patient is a 72 year old  male   Vitals: Blood pressure (!) 150/90, pulse 74, height 1.803 m (5' 11\"), weight 102.1 kg (225 lb), SpO2 96 %.  General Appearance Adult: Body mass index is 31.38 kg/m .  Alert, no acute distress, oriented  Lungs: no respiratory distress, or pursed lip breathing  Abdomen: soft, nontender, no organomegaly or masses  Back: no CVAT  Neuro: Alert, oriented, speech and mentation normal  Psych: affect and mood normal    Outside and Past Medical records:  Review of the result(s) of each unique test - PSA    ASSESSMENT and PLAN  72 year old male with stage pT3bN0, Dileep 3+4=7 prostate cancer, s/p RALP completed 7/22/2022. Negative margins. Recovering well. Excellent return of continence. Will increase dose of sildenafil.     - Follow-up 3 months with PSA    15 minutes spent on the date of the encounter doing chart review, history and exam, documentation and further activities as noted above.    Tim Faith MD  Urology  [facility]    "

## 2022-11-10 NOTE — NURSING NOTE
Chief Complaint   Patient presents with     PSA RESULTS     Pt here today for same day psa.   Ruby Douglass, CMA

## 2023-02-16 ENCOUNTER — OFFICE VISIT (OUTPATIENT)
Dept: UROLOGY | Facility: CLINIC | Age: 73
End: 2023-02-16
Payer: MEDICARE

## 2023-02-16 VITALS
DIASTOLIC BLOOD PRESSURE: 76 MMHG | HEIGHT: 71 IN | OXYGEN SATURATION: 97 % | HEART RATE: 99 BPM | SYSTOLIC BLOOD PRESSURE: 130 MMHG | BODY MASS INDEX: 31.92 KG/M2 | WEIGHT: 228 LBS

## 2023-02-16 DIAGNOSIS — C61 PROSTATE CANCER (H): Primary | ICD-10-CM

## 2023-02-16 LAB — PSA SERPL-MCNC: <0.04 UG/L (ref 0–4)

## 2023-02-16 PROCEDURE — 36415 COLL VENOUS BLD VENIPUNCTURE: CPT | Performed by: UROLOGY

## 2023-02-16 PROCEDURE — 84153 ASSAY OF PSA TOTAL: CPT | Performed by: UROLOGY

## 2023-02-16 PROCEDURE — 99213 OFFICE O/P EST LOW 20 MIN: CPT | Performed by: UROLOGY

## 2023-02-16 ASSESSMENT — PAIN SCALES - GENERAL: PAINLEVEL: NO PAIN (0)

## 2023-02-16 NOTE — NURSING NOTE
Chief Complaint   Patient presents with     history of prostate cancer     PSA check   Ledy Nieto LPN

## 2023-02-16 NOTE — PROGRESS NOTES
CHIEF COMPLAINT   It was my pleasure to see Ghassan Lambert who is a 72 year old male for follow-up of prostate cancer.      HPI  Ghassan Lambert is a very pleasant 72 year old male who presents with a history of prostate cancer. Underwent RALP 7/2022 with [place] 3+4=7 disease, stage pT3bN0 with negative margins. Recovering without complication. Excellent return of continence. Not wearing pads. Having partial erections with sildenafil.     PSA  2/16/2023 - <0.04  11/10/2022 - <0.04     RALP 7/22/2022             SPECIMEN   Procedure   Radical prostatectomy    TUMOR   Histologic Type   Acinar adenocarcinoma    Histologic Grade       Grade   Grade group 2 (Dileep Score 3 + 4 = 7)    Minor Tertiary Pattern 5 (less than 5%)   Not applicable    Percentage of Pattern 4   31 - 40%    Intraductal Carcinoma (IDC)   Not identified    Cribriform Glands   Present    Treatment Effect   No known presurgical therapy    TUMOR QUANTITATION       Estimated Percentage of Prostate Involved by Tumor   11 - 20%    Extraprostatic Extension (EPE)   Present, focal    Location of Extraprostatic Extension   Right posterior    Urinary Bladder Neck Invasion   Not identified    Seminal Vesicle Invasion   Present, right    Lymphovascular Invasion   Not Identified    Perineural Invasion   Present    MARGINS   Margin Status   All margins negative for invasive carcinoma    REGIONAL LYMPH NODES   Regional Lymph Node Status   All regional lymph nodes negative for tumor    Number of Lymph Nodes Examined   4    PATHOLOGIC STAGE CLASSIFICATION (pTNM, AJCC 8th Edition)   Reporting of pT, pN, and (when applicable) pM categories is based on information available to the pathologist at the time the report is issued. As per the AJCC (Chapter 1, 8th Ed.) it is the managing physician s responsibility to establish the final pathologic stage based upon all pertinent information, including but potentially not limited to this pathology report.   Primary  "Tumor (pT)   pT3b    pN Category   pN0      PHYSICAL EXAM  Patient is a 72 year old  male   Vitals: Blood pressure 130/76, pulse 99, height 1.803 m (5' 11\"), weight 103.4 kg (228 lb), SpO2 97 %.  General Appearance Adult: Body mass index is 31.8 kg/m .  Alert, no acute distress, oriented  Lungs: no respiratory distress, or pursed lip breathing  Abdomen: soft, nontender, no organomegaly or masses  Back: no CVAT  Neuro: Alert, oriented, speech and mentation normal  Psych: affect and mood normal    Outside and Past Medical records:  Review of the result(s) of each unique test - PSSA    ASSESSMENT and PLAN  72 year old male with stage pT3bN0, Dileep 3+4=7 prostate cancer, s/p RALP completed 7/22/2022. Negative margins. Recovering well. Excellent return of continence. Will continue sildenafil.     - Follow-up 3 months with PSA    15 minutes spent on the date of the encounter doing chart review, history and exam, documentation and further activities as noted above.    Tim Faith MD  Urology  [facility]    "

## 2023-04-01 ENCOUNTER — HEALTH MAINTENANCE LETTER (OUTPATIENT)
Age: 73
End: 2023-04-01

## 2023-05-18 ENCOUNTER — OFFICE VISIT (OUTPATIENT)
Dept: UROLOGY | Facility: CLINIC | Age: 73
End: 2023-05-18
Payer: MEDICARE

## 2023-05-18 VITALS
DIASTOLIC BLOOD PRESSURE: 80 MMHG | HEART RATE: 90 BPM | OXYGEN SATURATION: 96 % | HEIGHT: 71 IN | BODY MASS INDEX: 31.5 KG/M2 | SYSTOLIC BLOOD PRESSURE: 121 MMHG | WEIGHT: 225 LBS

## 2023-05-18 DIAGNOSIS — C61 PROSTATE CANCER (H): Primary | ICD-10-CM

## 2023-05-18 LAB — PSA SERPL-MCNC: <0.04 UG/L (ref 0–4)

## 2023-05-18 PROCEDURE — 99213 OFFICE O/P EST LOW 20 MIN: CPT | Performed by: UROLOGY

## 2023-05-18 PROCEDURE — 36415 COLL VENOUS BLD VENIPUNCTURE: CPT | Performed by: UROLOGY

## 2023-05-18 PROCEDURE — 84153 ASSAY OF PSA TOTAL: CPT | Performed by: UROLOGY

## 2023-05-18 ASSESSMENT — PAIN SCALES - GENERAL: PAINLEVEL: NO PAIN (0)

## 2023-05-18 NOTE — NURSING NOTE
Chief Complaint   Patient presents with     Prostate Cancer     Patient in clinic today for psa draw   talk about the psa today   Doing well pr patient   Thuy Wright, clinic assistant

## 2023-05-18 NOTE — PROGRESS NOTES
CHIEF COMPLAINT   It was my pleasure to see Ghassan Lambert who is a 72 year old male for follow-up of Prostate Cancer.      HPI  Ghassan Lambert is a very pleasant 72 year old male who presents with a history of prostate cancer. Underwent RALP 7/2022 with [place] 3+4=7 disease, stage pT3bN0 with negative margins. Recovering without complication. Excellent return of continence. Not wearing pads. Having partial erections with sildenafil.     PSA  5/18/2023 - <0.04  2/16/2023 - <0.04  11/10/2022 - <0.04    RALP 7/22/2022           SPECIMEN   Procedure   Radical prostatectomy    TUMOR   Histologic Type   Acinar adenocarcinoma    Histologic Grade       Grade   Grade group 2 ([place] Score 3 + 4 = 7)    Minor Tertiary Pattern 5 (less than 5%)   Not applicable    Percentage of Pattern 4   31 - 40%    Intraductal Carcinoma (IDC)   Not identified    Cribriform Glands   Present    Treatment Effect   No known presurgical therapy    TUMOR QUANTITATION       Estimated Percentage of Prostate Involved by Tumor   11 - 20%    Extraprostatic Extension (EPE)   Present, focal    Location of Extraprostatic Extension   Right posterior    Urinary Bladder Neck Invasion   Not identified    Seminal Vesicle Invasion   Present, right    Lymphovascular Invasion   Not Identified    Perineural Invasion   Present    MARGINS   Margin Status   All margins negative for invasive carcinoma    REGIONAL LYMPH NODES   Regional Lymph Node Status   All regional lymph nodes negative for tumor    Number of Lymph Nodes Examined   4    PATHOLOGIC STAGE CLASSIFICATION (pTNM, AJCC 8th Edition)   Reporting of pT, pN, and (when applicable) pM categories is based on information available to the pathologist at the time the report is issued. As per the AJCC (Chapter 1, 8th Ed.) it is the managing physician s responsibility to establish the final pathologic stage based upon all pertinent information, including but potentially not limited to this pathology  "report.   Primary Tumor (pT)   pT3b    pN Category   pN0      PHYSICAL EXAM  Patient is a 72 year old  male   Vitals: Blood pressure 121/80, pulse 90, height 1.803 m (5' 11\"), weight 102.1 kg (225 lb), SpO2 96 %.  General Appearance Adult: Body mass index is 31.38 kg/m .  Alert, no acute distress, oriented  Lungs: no respiratory distress, or pursed lip breathing  Abdomen: soft, nontender, no organomegaly or masses  Back: no CVAT  Neuro: Alert, oriented, speech and mentation normal  Psych: affect and mood normal    Outside and Past Medical records:  Review of the result(s) of each unique test - PSA     ASSESSMENT and PLAN  72 year old male with stage pT3bN0, [place] 3+4=7 prostate cancer, s/p RALP completed 7/22/2022. Negative margins. Recovering well. Excellent return of continence. Will continue sildenafil, but consider ICI in the future if no further improvement.     - Follow-up 3 months with PSA    15 minutes spent on the date of the encounter doing chart review, history and exam, documentation and further activities as noted above.    Tim Faith MD  Urology  [facility]    "

## 2023-05-18 NOTE — Clinical Note
5/18/2023       RE: Ghassan Lambert  [id number] [facility] [address]     Dear Colleague,    Thank you for referring your patient, Ghassan Lambert, to the [facility] at [facility]. Please see a copy of my visit note below.      CHIEF COMPLAINT   It was my pleasure to see Ghassan Lambert who is a 72 year old male for follow-up of Prostate Cancer.      HPI  Ghassan Lambert is a very pleasant 72 year old male who presents with a history of prostate cancer. Underwent RALP 7/2022 with [place] 3+4=7 disease, stage pT3bN0 with negative margins. Recovering without complication. Excellent return of continence. Not wearing pads. Having partial erections with sildenafil.     PSA  5/18/2023 - <0.04  2/16/2023 - <0.04  11/10/2022 - <0.04    RALP 7/22/2022             SPECIMEN   Procedure   Radical prostatectomy    TUMOR   Histologic Type   Acinar adenocarcinoma    Histologic Grade       Grade   Grade group 2 (Dileep Score 3 + 4 = 7)    Minor Tertiary Pattern 5 (less than 5%)   Not applicable    Percentage of Pattern 4   31 - 40%    Intraductal Carcinoma (IDC)   Not identified    Cribriform Glands   Present    Treatment Effect   No known presurgical therapy    TUMOR QUANTITATION       Estimated Percentage of Prostate Involved by Tumor   11 - 20%    Extraprostatic Extension (EPE)   Present, focal    Location of Extraprostatic Extension   Right posterior    Urinary Bladder Neck Invasion   Not identified    Seminal Vesicle Invasion   Present, right    Lymphovascular Invasion   Not Identified    Perineural Invasion   Present    MARGINS   Margin Status   All margins negative for invasive carcinoma    REGIONAL LYMPH NODES   Regional Lymph Node Status   All regional lymph nodes negative for tumor    Number of Lymph Nodes Examined   4    PATHOLOGIC STAGE CLASSIFICATION (pTNM, AJCC 8th Edition)   Reporting of pT, pN, and (when applicable) pM  "categories is based on information available to the pathologist at the time the report is issued. As per the AJCC (Chapter 1, 8th Ed.) it is the managing physician s responsibility to establish the final pathologic stage based upon all pertinent information, including but potentially not limited to this pathology report.   Primary Tumor (pT)   pT3b    pN Category   pN0          PHYSICAL EXAM  Patient is a 72 year old  male   Vitals: Blood pressure 121/80, pulse 90, height 1.803 m (5' 11\"), weight 102.1 kg (225 lb), SpO2 96 %.  General Appearance Adult: Body mass index is 31.38 kg/m .  Alert, no acute distress, oriented  Lungs: no respiratory distress, or pursed lip breathing  Abdomen: soft, nontender, no organomegaly or masses; ***  Back: *** CVAT  Neuro: Alert, oriented, speech and mentation normal  Psych: affect and mood normal  : {SHEREEN EXAM MALE GENITAL:710158}    Outside and Past Medical records:  Review of the result(s) of each unique test - PSSA     ASSESSMENT and PLAN  72 year old male with stage pT3bN0, Dileep 3+4=7 prostate cancer, s/p RALP completed 7/22/2022. Negative margins. Recovering well. Excellent return of continence. Will continue sildenafil.     - Follow-up 3 months with PSA    *** minutes spent on the date of the encounter doing chart review, history and exam, documentation and further activities as noted above.    Tim Faith MD  Urology  [facility]        Again, thank you for allowing me to participate in the care of your patient.      Sincerely,    Tim Faith MD    "

## 2023-08-17 ENCOUNTER — OFFICE VISIT (OUTPATIENT)
Dept: UROLOGY | Facility: CLINIC | Age: 73
End: 2023-08-17
Payer: MEDICARE

## 2023-08-17 VITALS
SYSTOLIC BLOOD PRESSURE: 141 MMHG | HEART RATE: 94 BPM | HEIGHT: 71 IN | WEIGHT: 220 LBS | BODY MASS INDEX: 30.8 KG/M2 | OXYGEN SATURATION: 96 % | DIASTOLIC BLOOD PRESSURE: 89 MMHG

## 2023-08-17 DIAGNOSIS — N52.31 ERECTILE DYSFUNCTION AFTER RADICAL PROSTATECTOMY: ICD-10-CM

## 2023-08-17 DIAGNOSIS — C61 PROSTATE CANCER (H): Primary | ICD-10-CM

## 2023-08-17 LAB — PSA SERPL-MCNC: <0.04 UG/L (ref 0–4)

## 2023-08-17 PROCEDURE — 36415 COLL VENOUS BLD VENIPUNCTURE: CPT | Performed by: UROLOGY

## 2023-08-17 PROCEDURE — 84153 ASSAY OF PSA TOTAL: CPT | Performed by: UROLOGY

## 2023-08-17 PROCEDURE — 99213 OFFICE O/P EST LOW 20 MIN: CPT | Performed by: UROLOGY

## 2023-08-17 RX ORDER — SILDENAFIL CITRATE 20 MG/1
20 TABLET ORAL DAILY PRN
Qty: 30 TABLET | Refills: 11 | Status: SHIPPED | OUTPATIENT
Start: 2023-08-17 | End: 2024-08-29

## 2023-08-17 ASSESSMENT — PAIN SCALES - GENERAL: PAINLEVEL: NO PAIN (0)

## 2023-08-17 NOTE — Clinical Note
8/17/2023       RE: Ghassan Lambert  [id number] [facility] [address]     Dear Colleague,    Thank you for referring your patient, Ghassan Lambert, to the [facility] UROLOGY [facility]IC [facility] at [facility]. Please see a copy of my visit note below.      CHIEF COMPLAINT   It was my pleasure to see Ghassan Lambert who is a 73 year old male for follow-up of Prostate Cancer.      HPI  Ghassan Lambert is a very pleasant 73 year old male who presents with a history of prostate cancer. Underwent RALP 7/2022 with [place] 3+4=7 disease, stage pT3bN0 with negative margins. Recovering without complication. Excellent return of continence. Not wearing pads. Having partial erections with sildenafil.     PSA  8/17/2023 - <0.04  5/18/2023 - <0.04  2/16/2023 - <0.04  11/10/2022 - <0.04     RALP 7/22/2022           SPECIMEN   Procedure   Radical prostatectomy    TUMOR   Histologic Type   Acinar adenocarcinoma    Histologic Grade       Grade   Grade group 2 ([place] Score 3 + 4 = 7)    Minor Tertiary Pattern 5 (less than 5%)   Not applicable    Percentage of Pattern 4   31 - 40%    Intraductal Carcinoma (IDC)   Not identified    Cribriform Glands   Present    Treatment Effect   No known presurgical therapy    TUMOR QUANTITATION       Estimated Percentage of Prostate Involved by Tumor   11 - 20%    Extraprostatic Extension (EPE)   Present, focal    Location of Extraprostatic Extension   Right posterior    Urinary Bladder Neck Invasion   Not identified    Seminal Vesicle Invasion   Present, right    Lymphovascular Invasion   Not Identified    Perineural Invasion   Present    MARGINS   Margin Status   All margins negative for invasive carcinoma    REGIONAL LYMPH NODES   Regional Lymph Node Status   All regional lymph nodes negative for tumor    Number of Lymph Nodes Examined   4    PATHOLOGIC STAGE CLASSIFICATION (pTNM, AJCC 8th Edition)   Reporting of pT, pN, and (when  "applicable) pM categories is based on information available to the pathologist at the time the report is issued. As per the AJCC (Chapter 1, 8th Ed.) it is the managing physician s responsibility to establish the final pathologic stage based upon all pertinent information, including but potentially not limited to this pathology report.   Primary Tumor (pT)   pT3b    pN Category   pN0          PHYSICAL EXAM  Patient is a 73 year old  male   Vitals: Blood pressure (!) 141/89, pulse 94, height 1.803 m (5' 11\"), weight 99.8 kg (220 lb), SpO2 96 %.  General Appearance Adult: Body mass index is 30.68 kg/m .  Alert, no acute distress, oriented  Lungs: no respiratory distress, or pursed lip breathing  Abdomen: soft, nontender, no organomegaly or masses; ***  Back: *** CVAT  Neuro: Alert, oriented, speech and mentation normal  Psych: affect and mood normal  : {SHEREEN EXAM MALE GENITAL:242965}    Outside and Past Medical records:  Review of the result(s) of each unique test - PSA     ASSESSMENT and PLAN  73 year old male with stage pT3bN0, [place] 3+4=7 prostate cancer, s/p RALP completed 7/22/2022. Negative margins. Recovering well. Excellent return of continence. Will continue sildenafil, but consider ICI in the future if no further improvement.     - Follow-up 6 months with PSA      *** minutes spent on the date of the encounter doing chart review, history and exam, documentation and further activities as noted above.    Tim Faith MD  Urology  [facility]        CHIEF COMPLAINT   It was my pleasure to see Ghassan Lambert who is a 73 year old male for follow-up of Prostate Cancer.      HPI  Ghassan Lambert is a very pleasant 73 year old male who presents with a history of prostate cancer. Underwent RALP 7/2022 with [place] 3+4=7 disease, stage pT3bN0 with negative margins. Recovering without complication. Excellent return of continence. Not wearing pads. Having partial erections with " "sildenafil.     PSA  8/17/2023 - <0.04  5/18/2023 - <0.04  2/16/2023 - <0.04  11/10/2022 - <0.04     RALP 7/22/2022           SPECIMEN   Procedure   Radical prostatectomy    TUMOR   Histologic Type   Acinar adenocarcinoma    Histologic Grade       Grade   Grade group 2 ([place] Score 3 + 4 = 7)    Minor Tertiary Pattern 5 (less than 5%)   Not applicable    Percentage of Pattern 4   31 - 40%    Intraductal Carcinoma (IDC)   Not identified    Cribriform Glands   Present    Treatment Effect   No known presurgical therapy    TUMOR QUANTITATION       Estimated Percentage of Prostate Involved by Tumor   11 - 20%    Extraprostatic Extension (EPE)   Present, focal    Location of Extraprostatic Extension   Right posterior    Urinary Bladder Neck Invasion   Not identified    Seminal Vesicle Invasion   Present, right    Lymphovascular Invasion   Not Identified    Perineural Invasion   Present    MARGINS   Margin Status   All margins negative for invasive carcinoma    REGIONAL LYMPH NODES   Regional Lymph Node Status   All regional lymph nodes negative for tumor    Number of Lymph Nodes Examined   4    PATHOLOGIC STAGE CLASSIFICATION (pTNM, AJCC 8th Edition)   Reporting of pT, pN, and (when applicable) pM categories is based on information available to the pathologist at the time the report is issued. As per the AJCC (Chapter 1, 8th Ed.) it is the managing physician s responsibility to establish the final pathologic stage based upon all pertinent information, including but potentially not limited to this pathology report.   Primary Tumor (pT)   pT3b    pN Category   pN0      PHYSICAL EXAM  Patient is a 73 year old  male   Vitals: Blood pressure (!) 141/89, pulse 94, height 1.803 m (5' 11\"), weight 99.8 kg (220 lb), SpO2 96 %.  General Appearance Adult: Body mass index is 30.68 kg/m .  Alert, no acute distress, oriented  Lungs: no respiratory distress, or pursed lip breathing  Abdomen: soft, nontender, no organomegaly or " masses  Back: no CVAT  Neuro: Alert, oriented, speech and mentation normal  Psych: affect and mood normal    Outside and Past Medical records:  Review of the result(s) of each unique test - PSA     ASSESSMENT and PLAN  73 year old male with stage pT3bN0, [place] 3+4=7 prostate cancer, s/p RALP completed 7/22/2022. Negative margins. Recovering well. Excellent return of continence. Will continue sildenafil, but consider ICI in the future if no further improvement.     - Sildenafil renewed today  - Follow-up 6 months with PSA    15 minutes spent on the date of the encounter doing chart review, history and exam, documentation and further activities as noted above.    Tim Faith MD  Urology  [facility]        Again, thank you for allowing me to participate in the care of your patient.      Sincerely,    Tim Faith MD

## 2023-08-17 NOTE — NURSING NOTE
Chief Complaint   Patient presents with    Follow Up    Prostate Cancer     Here in clinic for psa draw today    Talk about psa today   Everything is going well pr patient.  Once a night to urinate .

## 2023-08-17 NOTE — PROGRESS NOTES
CHIEF COMPLAINT   It was my pleasure to see Ghassan Lambert who is a 73 year old male for follow-up of Prostate Cancer.      HPI  Ghassan Lambert is a very pleasant 73 year old male who presents with a history of prostate cancer. Underwent RALP 7/2022 with [place] 3+4=7 disease, stage pT3bN0 with negative margins. Recovering without complication. Excellent return of continence. Not wearing pads. Having partial erections with sildenafil.     PSA  8/17/2023 - <0.04  5/18/2023 - <0.04  2/16/2023 - <0.04  11/10/2022 - <0.04     RALP 7/22/2022           SPECIMEN   Procedure   Radical prostatectomy    TUMOR   Histologic Type   Acinar adenocarcinoma    Histologic Grade       Grade   Grade group 2 ([place] Score 3 + 4 = 7)    Minor Tertiary Pattern 5 (less than 5%)   Not applicable    Percentage of Pattern 4   31 - 40%    Intraductal Carcinoma (IDC)   Not identified    Cribriform Glands   Present    Treatment Effect   No known presurgical therapy    TUMOR QUANTITATION       Estimated Percentage of Prostate Involved by Tumor   11 - 20%    Extraprostatic Extension (EPE)   Present, focal    Location of Extraprostatic Extension   Right posterior    Urinary Bladder Neck Invasion   Not identified    Seminal Vesicle Invasion   Present, right    Lymphovascular Invasion   Not Identified    Perineural Invasion   Present    MARGINS   Margin Status   All margins negative for invasive carcinoma    REGIONAL LYMPH NODES   Regional Lymph Node Status   All regional lymph nodes negative for tumor    Number of Lymph Nodes Examined   4    PATHOLOGIC STAGE CLASSIFICATION (pTNM, AJCC 8th Edition)   Reporting of pT, pN, and (when applicable) pM categories is based on information available to the pathologist at the time the report is issued. As per the AJCC (Chapter 1, 8th Ed.) it is the managing physician s responsibility to establish the final pathologic stage based upon all pertinent information, including but potentially not limited to  "this pathology report.   Primary Tumor (pT)   pT3b    pN Category   pN0      PHYSICAL EXAM  Patient is a 73 year old  male   Vitals: Blood pressure (!) 141/89, pulse 94, height 1.803 m (5' 11\"), weight 99.8 kg (220 lb), SpO2 96 %.  General Appearance Adult: Body mass index is 30.68 kg/m .  Alert, no acute distress, oriented  Lungs: no respiratory distress, or pursed lip breathing  Abdomen: soft, nontender, no organomegaly or masses  Back: no CVAT  Neuro: Alert, oriented, speech and mentation normal  Psych: affect and mood normal    Outside and Past Medical records:  Review of the result(s) of each unique test - PSA     ASSESSMENT and PLAN  73 year old male with stage pT3bN0, [place] 3+4=7 prostate cancer, s/p RALP completed 7/22/2022. Negative margins. Recovering well. Excellent return of continence. Will continue sildenafil, but consider ICI in the future if no further improvement.     - Sildenafil renewed today  - Follow-up 6 months with PSA    15 minutes spent on the date of the encounter doing chart review, history and exam, documentation and further activities as noted above.    Tim Faith MD  Urology  [facility]    "

## 2024-02-22 ENCOUNTER — OFFICE VISIT (OUTPATIENT)
Dept: UROLOGY | Facility: CLINIC | Age: 74
End: 2024-02-22
Payer: MEDICARE

## 2024-02-22 VITALS
BODY MASS INDEX: 30.94 KG/M2 | DIASTOLIC BLOOD PRESSURE: 81 MMHG | HEIGHT: 71 IN | SYSTOLIC BLOOD PRESSURE: 156 MMHG | HEART RATE: 99 BPM | WEIGHT: 221 LBS | OXYGEN SATURATION: 96 %

## 2024-02-22 DIAGNOSIS — C61 PROSTATE CANCER (H): Primary | ICD-10-CM

## 2024-02-22 LAB — PSA SERPL-MCNC: <0.04 UG/L (ref 0–4)

## 2024-02-22 PROCEDURE — 84153 ASSAY OF PSA TOTAL: CPT | Performed by: UROLOGY

## 2024-02-22 PROCEDURE — 99213 OFFICE O/P EST LOW 20 MIN: CPT | Performed by: UROLOGY

## 2024-02-22 PROCEDURE — 36415 COLL VENOUS BLD VENIPUNCTURE: CPT | Performed by: UROLOGY

## 2024-02-22 ASSESSMENT — PAIN SCALES - GENERAL: PAINLEVEL: NO PAIN (0)

## 2024-02-22 NOTE — NURSING NOTE
Chief Complaint   Patient presents with    hx prostate cancer     Here in clinic for a psa draw    Talk about the psa today   Everything  is going well   Thuy Wright, CMA

## 2024-02-22 NOTE — PROGRESS NOTES
CHIEF COMPLAINT   It was my pleasure to see Ghassan Lambert who is a 73 year old male for follow-up of Prostate Cancer.      HPI  Ghassan Lambert is a very pleasant 73 year old male who presents with a history of prostate cancer. Underwent RALP 7/2022 with [place] 3+4=7 disease, stage pT3bN0 with negative margins. Recovering without complication. Excellent return of continence. Not wearing pads. Having partial erections with sildenafil.     PSA  2/22/2024 - <0.04  8/17/2023 - <0.04  5/18/2023 - <0.04  2/16/2023 - <0.04  11/10/2022 - <0.04     RALP 7/22/2022           SPECIMEN   Procedure   Radical prostatectomy    TUMOR   Histologic Type   Acinar adenocarcinoma    Histologic Grade       Grade   Grade group 2 ([place] Score 3 + 4 = 7)    Minor Tertiary Pattern 5 (less than 5%)   Not applicable    Percentage of Pattern 4   31 - 40%    Intraductal Carcinoma (IDC)   Not identified    Cribriform Glands   Present    Treatment Effect   No known presurgical therapy    TUMOR QUANTITATION       Estimated Percentage of Prostate Involved by Tumor   11 - 20%    Extraprostatic Extension (EPE)   Present, focal    Location of Extraprostatic Extension   Right posterior    Urinary Bladder Neck Invasion   Not identified    Seminal Vesicle Invasion   Present, right    Lymphovascular Invasion   Not Identified    Perineural Invasion   Present    MARGINS   Margin Status   All margins negative for invasive carcinoma    REGIONAL LYMPH NODES   Regional Lymph Node Status   All regional lymph nodes negative for tumor    Number of Lymph Nodes Examined   4    PATHOLOGIC STAGE CLASSIFICATION (pTNM, AJCC 8th Edition)   Reporting of pT, pN, and (when applicable) pM categories is based on information available to the pathologist at the time the report is issued. As per the AJCC (Chapter 1, 8th Ed.) it is the managing physician s responsibility to establish the final pathologic stage based upon all pertinent information, including but  "potentially not limited to this pathology report.   Primary Tumor (pT)   pT3b    pN Category   pN0      PHYSICAL EXAM  Patient is a 73 year old  male   Vitals: Blood pressure (!) 156/81, pulse 99, height 1.803 m (5' 11\"), weight 100.2 kg (221 lb), SpO2 96%.  General Appearance Adult: Body mass index is 30.82 kg/m .  Alert, no acute distress, oriented  Lungs: no respiratory distress, or pursed lip breathing  Abdomen: soft, nontender, no organomegaly or masses  Back: no CVAT  Neuro: Alert, oriented, speech and mentation normal  Psych: affect and mood normal    Outside and Past Medical records:  Review of the result(s) of each unique test - PSA     ASSESSMENT and PLAN  73 year old male with stage pT3bN0, [place] 3+4=7 prostate cancer, s/p RALP completed 7/22/2022. Negative margins. Recovering well. Excellent return of continence. Will continue sildenafil, but consider ICI in the future if no further improvement.     - Follow-up 6 months with PSA    10 minutes spent on the date of the encounter doing chart review, history and exam, documentation and further activities as noted above.    Tim Faith MD  Urology  [facility]    "

## 2024-02-22 NOTE — Clinical Note
2/22/2024       RE: Ghassan Lambert  [address] [address] [facility] [address]     Dear Colleague,    Thank you for referring your patient, Ghassan Lambert, to the [facility] UROLOGY CLINIC [facility] at [facility]. Please see a copy of my visit note below.      CHIEF COMPLAINT   It was my pleasure to see Ghassan Lambert who is a 73 year old male for follow-up of Prostate Cancer.      HPI  Ghassan Lambert is a very pleasant 73 year old male who presents with a history of prostate cancer. Underwent RALP 7/2022 with [place] 3+4=7 disease, stage pT3bN0 with negative margins. Recovering without complication. Excellent return of continence. Not wearing pads. Having partial erections with sildenafil.     PSA  2/22/2024 - <0.04  8/17/2023 - <0.04  5/18/2023 - <0.04  2/16/2023 - <0.04  11/10/2022 - <0.04     RALP 7/22/2022           SPECIMEN   Procedure   Radical prostatectomy    TUMOR   Histologic Type   Acinar adenocarcinoma    Histologic Grade       Grade   Grade group 2 ([place] Score 3 + 4 = 7)    Minor Tertiary Pattern 5 (less than 5%)   Not applicable    Percentage of Pattern 4   31 - 40%    Intraductal Carcinoma (IDC)   Not identified    Cribriform Glands   Present    Treatment Effect   No known presurgical therapy    TUMOR QUANTITATION       Estimated Percentage of Prostate Involved by Tumor   11 - 20%    Extraprostatic Extension (EPE)   Present, focal    Location of Extraprostatic Extension   Right posterior    Urinary Bladder Neck Invasion   Not identified    Seminal Vesicle Invasion   Present, right    Lymphovascular Invasion   Not Identified    Perineural Invasion   Present    MARGINS   Margin Status   All margins negative for invasive carcinoma    REGIONAL LYMPH NODES   Regional Lymph Node Status   All regional lymph nodes negative for tumor    Number of Lymph Nodes Examined   4    PATHOLOGIC STAGE CLASSIFICATION (pTNM, AJCC 8th Edition)   Reporting of  "pT, pN, and (when applicable) pM categories is based on information available to the pathologist at the time the report is issued. As per the AJCC (Chapter 1, 8th Ed.) it is the managing physician s responsibility to establish the final pathologic stage based upon all pertinent information, including but potentially not limited to this pathology report.   Primary Tumor (pT)   pT3b    pN Category   pN0        PHYSICAL EXAM  Patient is a 73 year old  male   Vitals: Blood pressure (!) 156/81, pulse 99, height 1.803 m (5' 11\"), weight 100.2 kg (221 lb), SpO2 96%.  General Appearance Adult: Body mass index is 30.82 kg/m .  Alert, no acute distress, oriented  Lungs: no respiratory distress, or pursed lip breathing  Abdomen: soft, nontender, no organomegaly or masses; ***  Back: *** CVAT  Neuro: Alert, oriented, speech and mentation normal  Psych: affect and mood normal  : {SHEREEN EXAM MALE GENITAL:670184}    Outside and Past Medical records:  Review of the result(s) of each unique test - PSA     ASSESSMENT and PLAN  73 year old male with stage pT3bN0, [place] 3+4=7 prostate cancer, s/p RALP completed 7/22/2022. Negative margins. Recovering well. Excellent return of continence. Will continue sildenafil, but consider ICI in the future if no further improvement.     - Sildenafil renewed today  - Follow-up 6 months with PSA      *** minutes spent on the date of the encounter doing chart review, history and exam, documentation and further activities as noted above.    Tim Faith MD  Urology  [facility]        CHIEF COMPLAINT   It was my pleasure to see Ghassan Lambert who is a 73 year old male for follow-up of Prostate Cancer.      HPI  Ghassan Lambert is a very pleasant 73 year old male who presents with a history of prostate cancer. Underwent RALP 7/2022 with [place] 3+4=7 disease, stage pT3bN0 with negative margins. Recovering without complication. Excellent return of continence. Not wearing " "pads. Having partial erections with sildenafil.     PSA  2/22/2024 - <0.04  8/17/2023 - <0.04  5/18/2023 - <0.04  2/16/2023 - <0.04  11/10/2022 - <0.04     RALP 7/22/2022           SPECIMEN   Procedure   Radical prostatectomy    TUMOR   Histologic Type   Acinar adenocarcinoma    Histologic Grade       Grade   Grade group 2 ([place] Score 3 + 4 = 7)    Minor Tertiary Pattern 5 (less than 5%)   Not applicable    Percentage of Pattern 4   31 - 40%    Intraductal Carcinoma (IDC)   Not identified    Cribriform Glands   Present    Treatment Effect   No known presurgical therapy    TUMOR QUANTITATION       Estimated Percentage of Prostate Involved by Tumor   11 - 20%    Extraprostatic Extension (EPE)   Present, focal    Location of Extraprostatic Extension   Right posterior    Urinary Bladder Neck Invasion   Not identified    Seminal Vesicle Invasion   Present, right    Lymphovascular Invasion   Not Identified    Perineural Invasion   Present    MARGINS   Margin Status   All margins negative for invasive carcinoma    REGIONAL LYMPH NODES   Regional Lymph Node Status   All regional lymph nodes negative for tumor    Number of Lymph Nodes Examined   4    PATHOLOGIC STAGE CLASSIFICATION (pTNM, AJCC 8th Edition)   Reporting of pT, pN, and (when applicable) pM categories is based on information available to the pathologist at the time the report is issued. As per the AJCC (Chapter 1, 8th Ed.) it is the managing physician s responsibility to establish the final pathologic stage based upon all pertinent information, including but potentially not limited to this pathology report.   Primary Tumor (pT)   pT3b    pN Category   pN0      PHYSICAL EXAM  Patient is a 73 year old  male   Vitals: Blood pressure (!) 156/81, pulse 99, height 1.803 m (5' 11\"), weight 100.2 kg (221 lb), SpO2 96%.  General Appearance Adult: Body mass index is 30.82 kg/m .  Alert, no acute distress, oriented  Lungs: no respiratory distress, or pursed lip " breathing  Abdomen: soft, nontender, no organomegaly or masses  Back: no CVAT  Neuro: Alert, oriented, speech and mentation normal  Psych: affect and mood normal    Outside and Past Medical records:  Review of the result(s) of each unique test - PSA     ASSESSMENT and PLAN  73 year old male with stage pT3bN0, [place] 3+4=7 prostate cancer, s/p RALP completed 7/22/2022. Negative margins. Recovering well. Excellent return of continence. Will continue sildenafil, but consider ICI in the future if no further improvement.     - Follow-up 6 months with PSA    10 minutes spent on the date of the encounter doing chart review, history and exam, documentation and further activities as noted above.    Tim Faith MD  Urology  [facility]        Again, thank you for allowing me to participate in the care of your patient.      Sincerely,    Tim Faith MD

## 2024-04-22 ENCOUNTER — APPOINTMENT (OUTPATIENT)
Dept: URBAN - METROPOLITAN AREA CLINIC 256 | Age: 74
Setting detail: DERMATOLOGY
End: 2024-04-23

## 2024-04-22 VITALS — WEIGHT: 225 LBS | HEIGHT: 71 IN

## 2024-04-22 DIAGNOSIS — D18.0 HEMANGIOMA: ICD-10-CM

## 2024-04-22 DIAGNOSIS — L57.8 OTHER SKIN CHANGES DUE TO CHRONIC EXPOSURE TO NONIONIZING RADIATION: ICD-10-CM

## 2024-04-22 DIAGNOSIS — Z71.89 OTHER SPECIFIED COUNSELING: ICD-10-CM

## 2024-04-22 DIAGNOSIS — D22 MELANOCYTIC NEVI: ICD-10-CM

## 2024-04-22 DIAGNOSIS — L82.1 OTHER SEBORRHEIC KERATOSIS: ICD-10-CM

## 2024-04-22 DIAGNOSIS — L82.0 INFLAMED SEBORRHEIC KERATOSIS: ICD-10-CM

## 2024-04-22 DIAGNOSIS — L72.8 OTHER FOLLICULAR CYSTS OF THE SKIN AND SUBCUTANEOUS TISSUE: ICD-10-CM

## 2024-04-22 PROBLEM — D22.39 MELANOCYTIC NEVI OF OTHER PARTS OF FACE: Status: ACTIVE | Noted: 2024-04-22

## 2024-04-22 PROBLEM — D18.01 HEMANGIOMA OF SKIN AND SUBCUTANEOUS TISSUE: Status: ACTIVE | Noted: 2024-04-22

## 2024-04-22 PROBLEM — D22.62 MELANOCYTIC NEVI OF LEFT UPPER LIMB, INCLUDING SHOULDER: Status: ACTIVE | Noted: 2024-04-22

## 2024-04-22 PROBLEM — D22.5 MELANOCYTIC NEVI OF TRUNK: Status: ACTIVE | Noted: 2024-04-22

## 2024-04-22 PROBLEM — D22.61 MELANOCYTIC NEVI OF RIGHT UPPER LIMB, INCLUDING SHOULDER: Status: ACTIVE | Noted: 2024-04-22

## 2024-04-22 PROCEDURE — OTHER MIPS QUALITY: OTHER

## 2024-04-22 PROCEDURE — OTHER COUNSELING: OTHER

## 2024-04-22 PROCEDURE — OTHER LIQUID NITROGEN: OTHER

## 2024-04-22 PROCEDURE — 99213 OFFICE O/P EST LOW 20 MIN: CPT | Mod: 25

## 2024-04-22 PROCEDURE — 17110 DESTRUCT B9 LESION 1-14: CPT

## 2024-04-22 ASSESSMENT — LOCATION DETAILED DESCRIPTION DERM
LOCATION DETAILED: LEFT SUPERIOR MEDIAL UPPER BACK
LOCATION DETAILED: RIGHT POSTERIOR SHOULDER
LOCATION DETAILED: LEFT INFERIOR LATERAL MALAR CHEEK
LOCATION DETAILED: LEFT INFERIOR CENTRAL MALAR CHEEK
LOCATION DETAILED: SUPERIOR THORACIC SPINE
LOCATION DETAILED: LEFT ANTECUBITAL SKIN
LOCATION DETAILED: LEFT VENTRAL PROXIMAL FOREARM
LOCATION DETAILED: LEFT CENTRAL MALAR CHEEK
LOCATION DETAILED: RIGHT NASAL ROOT
LOCATION DETAILED: RIGHT VENTRAL PROXIMAL FOREARM

## 2024-04-22 ASSESSMENT — LOCATION ZONE DERM
LOCATION ZONE: NOSE
LOCATION ZONE: FACE
LOCATION ZONE: TRUNK
LOCATION ZONE: ARM

## 2024-04-22 ASSESSMENT — LOCATION SIMPLE DESCRIPTION DERM
LOCATION SIMPLE: UPPER BACK
LOCATION SIMPLE: RIGHT FOREARM
LOCATION SIMPLE: LEFT UPPER ARM
LOCATION SIMPLE: NOSE
LOCATION SIMPLE: LEFT FOREARM
LOCATION SIMPLE: LEFT CHEEK
LOCATION SIMPLE: LEFT UPPER BACK
LOCATION SIMPLE: RIGHT SHOULDER

## 2024-04-22 NOTE — PROCEDURE: LIQUID NITROGEN
Number Of Freeze-Thaw Cycles: 1 freeze-thaw cycle
Render Post-Care Instructions In Note?: no
Show Applicator Variable?: Yes
Post-Care Instructions: I reviewed with the patient in detail post-care instructions. Patient is to wear sunprotection, and avoid picking at any of the treated lesions. Pt may apply Vaseline to crusted or scabbing areas.
Duration Of Freeze Thaw-Cycle (Seconds): 3
Detail Level: Detailed
Consent: The patient's verbal consent was obtained including but not limited to risks of crusting, scabbing, blistering, scarring, darker or lighter pigmentary change, recurrence, incomplete removal and infection.
Spray Paint Text: The liquid nitrogen was applied to the skin utilizing a spray paint frosting technique.
Medical Necessity Information: It is in your best interest to select a reason for this procedure from the list below. All of these items fulfill various CMS LCD requirements except the new and changing color options.
Medical Necessity Clause: This procedure was medically necessary because the lesions that were treated were:

## 2024-06-02 ENCOUNTER — HEALTH MAINTENANCE LETTER (OUTPATIENT)
Age: 74
End: 2024-06-02

## 2024-08-17 DIAGNOSIS — N52.31 ERECTILE DYSFUNCTION AFTER RADICAL PROSTATECTOMY: ICD-10-CM

## 2024-08-17 DIAGNOSIS — C61 PROSTATE CANCER (H): ICD-10-CM

## 2024-08-22 ENCOUNTER — OFFICE VISIT (OUTPATIENT)
Dept: UROLOGY | Facility: CLINIC | Age: 74
End: 2024-08-22
Payer: MEDICARE

## 2024-08-22 VITALS
WEIGHT: 228 LBS | HEART RATE: 75 BPM | OXYGEN SATURATION: 98 % | SYSTOLIC BLOOD PRESSURE: 132 MMHG | DIASTOLIC BLOOD PRESSURE: 86 MMHG | BODY MASS INDEX: 30.88 KG/M2 | HEIGHT: 72 IN

## 2024-08-22 DIAGNOSIS — C61 PROSTATE CANCER (H): Primary | ICD-10-CM

## 2024-08-22 LAB — PSA SERPL-MCNC: <0.04 UG/L (ref 0–4)

## 2024-08-22 PROCEDURE — 36415 COLL VENOUS BLD VENIPUNCTURE: CPT | Performed by: UROLOGY

## 2024-08-22 PROCEDURE — 99213 OFFICE O/P EST LOW 20 MIN: CPT | Performed by: UROLOGY

## 2024-08-22 PROCEDURE — 84153 ASSAY OF PSA TOTAL: CPT | Performed by: UROLOGY

## 2024-08-22 ASSESSMENT — PAIN SCALES - GENERAL: PAINLEVEL: NO PAIN (0)

## 2024-08-22 NOTE — NURSING NOTE
Chief Complaint   Patient presents with    hx prostate cancer     Here in clinic psa draw    Talk about the psa today   Everything going well   Thuy Wright, CMA

## 2024-08-22 NOTE — Clinical Note
8/22/2024       RE: Ghassan Lambert  [id number] [facility] [address]     Dear Colleague,    Thank you for referring your patient, Ghassan Lambert, to the [facility] at [facility]. Please see a copy of my visit note below.      CHIEF COMPLAINT   It was my pleasure to see Ghassan Lambert who is a 74 year old male for follow-up of Prostate Cancer.      HPI  Ghassan Lambert is a very pleasant 74 year old male who presents with a history of prostate cancer. Underwent RALP 7/2022 with [place] 3+4=7 disease, stage pT3bN0 with negative margins. Recovering without complication. Excellent return of continence. Not wearing pads. Having partial erections with sildenafil.     PSA  8/22/2024 - <0.04  2/22/2024 - <0.04  8/17/2023 - <0.04  5/18/2023 - <0.04  2/16/2023 - <0.04  11/10/2022 - <0.04     RALP 7/22/2022           SPECIMEN   Procedure   Radical prostatectomy    TUMOR   Histologic Type   Acinar adenocarcinoma    Histologic Grade       Grade   Grade group 2 ([place] Score 3 + 4 = 7)    Minor Tertiary Pattern 5 (less than 5%)   Not applicable    Percentage of Pattern 4   31 - 40%    Intraductal Carcinoma (IDC)   Not identified    Cribriform Glands   Present    Treatment Effect   No known presurgical therapy    TUMOR QUANTITATION       Estimated Percentage of Prostate Involved by Tumor   11 - 20%    Extraprostatic Extension (EPE)   Present, focal    Location of Extraprostatic Extension   Right posterior    Urinary Bladder Neck Invasion   Not identified    Seminal Vesicle Invasion   Present, right    Lymphovascular Invasion   Not Identified    Perineural Invasion   Present    MARGINS   Margin Status   All margins negative for invasive carcinoma    REGIONAL LYMPH NODES   Regional Lymph Node Status   All regional lymph nodes negative for tumor    Number of Lymph Nodes Examined   4    PATHOLOGIC STAGE CLASSIFICATION (pTNM, AJCC 8th  Edition)   Reporting of pT, pN, and (when applicable) pM categories is based on information available to the pathologist at the time the report is issued. As per the AJCC (Chapter 1, 8th Ed.) it is the managing physician s responsibility to establish the final pathologic stage based upon all pertinent information, including but potentially not limited to this pathology report.   Primary Tumor (pT)   pT3b    pN Category   pN0        PHYSICAL EXAM  Patient is a 74 year old  male   Vitals: Blood pressure 132/86, pulse 75, height 1.829 m (6'), weight 103.4 kg (228 lb), SpO2 98%.  General Appearance Adult: Body mass index is 30.92 kg/m .  Alert, no acute distress, oriented  Lungs: no respiratory distress, or pursed lip breathing  Abdomen: soft, nontender, no organomegaly or masses; ***  Back: *** CVAT  Neuro: Alert, oriented, speech and mentation normal  Psych: affect and mood normal  : {SHEREEN EXAM MALE GENITAL:026138}    Outside and Past Medical records:  Review of the result(s) of each unique test - PSA     ASSESSMENT and PLAN  74 year old male with stage pT3bN0, [place] 3+4=7 prostate cancer, s/p RALP completed 7/22/2022. Negative margins. Recovering well. Excellent return of continence. Will continue sildenafil, but consider ICI in the future if no further improvement.     - Follow-up 6 months with PSA    *** minutes spent on the date of the encounter doing chart review, history and exam, documentation and further activities as noted above.    Tim Faith MD  Urology  [facility]        CHIEF COMPLAINT   It was my pleasure to see Ghassan Lambert who is a 74 year old male for follow-up of Prostate Cancer.      HPI  Ghassan Lambert is a very pleasant 74 year old male who presents with a history of prostate cancer. Underwent RALP 7/2022 with [place] 3+4=7 disease, stage pT3bN0 with negative margins. Recovering without complication. Excellent return of continence. Not wearing pads. Having  partial erections with sildenafil.     PSA  8/22/2024 - <0.04  2/22/2024 - <0.04  8/17/2023 - <0.04  5/18/2023 - <0.04  2/16/2023 - <0.04  11/10/2022 - <0.04     RALP 7/22/2022           SPECIMEN   Procedure   Radical prostatectomy    TUMOR   Histologic Type   Acinar adenocarcinoma    Histologic Grade       Grade   Grade group 2 ([place] Score 3 + 4 = 7)    Minor Tertiary Pattern 5 (less than 5%)   Not applicable    Percentage of Pattern 4   31 - 40%    Intraductal Carcinoma (IDC)   Not identified    Cribriform Glands   Present    Treatment Effect   No known presurgical therapy    TUMOR QUANTITATION       Estimated Percentage of Prostate Involved by Tumor   11 - 20%    Extraprostatic Extension (EPE)   Present, focal    Location of Extraprostatic Extension   Right posterior    Urinary Bladder Neck Invasion   Not identified    Seminal Vesicle Invasion   Present, right    Lymphovascular Invasion   Not Identified    Perineural Invasion   Present    MARGINS   Margin Status   All margins negative for invasive carcinoma    REGIONAL LYMPH NODES   Regional Lymph Node Status   All regional lymph nodes negative for tumor    Number of Lymph Nodes Examined   4    PATHOLOGIC STAGE CLASSIFICATION (pTNM, AJCC 8th Edition)   Reporting of pT, pN, and (when applicable) pM categories is based on information available to the pathologist at the time the report is issued. As per the AJCC (Chapter 1, 8th Ed.) it is the managing physician s responsibility to establish the final pathologic stage based upon all pertinent information, including but potentially not limited to this pathology report.   Primary Tumor (pT)   pT3b    pN Category   pN0      PHYSICAL EXAM  Patient is a 74 year old  male   Vitals: Blood pressure 132/86, pulse 75, height 1.829 m (6'), weight 103.4 kg (228 lb), SpO2 98%.  General Appearance Adult: Body mass index is 30.92 kg/m .  Alert, no acute distress, oriented  Lungs: no respiratory distress, or pursed lip  breathing  Abdomen: soft, nontender, no organomegaly or masses; ***  Back: *** CVAT  Neuro: Alert, oriented, speech and mentation normal  Psych: affect and mood normal  : {SHEREEN EXAM MALE GENITAL:071662}    Outside and Past Medical records:  Review of the result(s) of each unique test - PSA     ASSESSMENT and PLAN  74 year old male with stage pT3bN0, [place] 3+4=7 prostate cancer, s/p RALP completed 7/22/2022. Negative margins. Recovering well. Excellent return of continence. Will continue sildenafil, but consider ICI in the future if no further improvement.     - Follow-up 6 months with PSA    *** minutes spent on the date of the encounter doing chart review, history and exam, documentation and further activities as noted above.    Tim Faith MD  Urology  [facility]        Again, thank you for allowing me to participate in the care of your patient.      Sincerely,    Tim Faith MD

## 2024-08-22 NOTE — PROGRESS NOTES
CHIEF COMPLAINT   It was my pleasure to see Ghassan Lambert who is a 74 year old male for follow-up of Prostate Cancer.      HPI  Ghassan Lambert is a very pleasant 74 year old male who presents with a history of prostate cancer. Underwent RALP 7/2022 with [place] 3+4=7 disease, stage pT3bN0 with negative margins. Recovering without complication. Excellent return of continence. Not wearing pads. Having partial erections with sildenafil.     PSA  8/22/2024 - <0.04  2/22/2024 - <0.04  8/17/2023 - <0.04  5/18/2023 - <0.04  2/16/2023 - <0.04  11/10/2022 - <0.04     RALP 7/22/2022           SPECIMEN   Procedure   Radical prostatectomy    TUMOR   Histologic Type   Acinar adenocarcinoma    Histologic Grade       Grade   Grade group 2 ([place] Score 3 + 4 = 7)    Minor Tertiary Pattern 5 (less than 5%)   Not applicable    Percentage of Pattern 4   31 - 40%    Intraductal Carcinoma (IDC)   Not identified    Cribriform Glands   Present    Treatment Effect   No known presurgical therapy    TUMOR QUANTITATION       Estimated Percentage of Prostate Involved by Tumor   11 - 20%    Extraprostatic Extension (EPE)   Present, focal    Location of Extraprostatic Extension   Right posterior    Urinary Bladder Neck Invasion   Not identified    Seminal Vesicle Invasion   Present, right    Lymphovascular Invasion   Not Identified    Perineural Invasion   Present    MARGINS   Margin Status   All margins negative for invasive carcinoma    REGIONAL LYMPH NODES   Regional Lymph Node Status   All regional lymph nodes negative for tumor    Number of Lymph Nodes Examined   4    PATHOLOGIC STAGE CLASSIFICATION (pTNM, AJCC 8th Edition)   Reporting of pT, pN, and (when applicable) pM categories is based on information available to the pathologist at the time the report is issued. As per the AJCC (Chapter 1, 8th Ed.) it is the managing physician s responsibility to establish the final pathologic stage based upon all pertinent information,  including but potentially not limited to this pathology report.   Primary Tumor (pT)   pT3b    pN Category   pN0      PHYSICAL EXAM  Patient is a 74 year old  male   Vitals: Blood pressure 132/86, pulse 75, height 1.829 m (6'), weight 103.4 kg (228 lb), SpO2 98%.  General Appearance Adult: Body mass index is 30.92 kg/m .  Alert, no acute distress, oriented  Lungs: no respiratory distress, or pursed lip breathing  Abdomen: soft, nontender, no organomegaly or masses  Back: no CVAT  Neuro: Alert, oriented, speech and mentation normal  Psych: affect and mood normal    Outside and Past Medical records:  Review of the result(s) of each unique test - PSA     ASSESSMENT and PLAN  74 year old male with stage pT3bN0, [place] 3+4=7 prostate cancer, s/p RALP completed 7/22/2022. Negative margins. Recovering well. Excellent return of continence. Will continue sildenafil, but consider ICI in the future if no further improvement.     - PSA in 6 months - will call with results  - Follow-up 12 months with PSA    10 minutes spent on the date of the encounter doing chart review, history and exam, documentation and further activities as noted above.    Tim Faith MD  Urology  [facility]

## 2024-08-29 ENCOUNTER — TELEPHONE (OUTPATIENT)
Dept: UROLOGY | Facility: CLINIC | Age: 74
End: 2024-08-29
Payer: MEDICARE

## 2024-08-29 DIAGNOSIS — N52.31 ERECTILE DYSFUNCTION AFTER RADICAL PROSTATECTOMY: ICD-10-CM

## 2024-08-29 DIAGNOSIS — C61 PROSTATE CANCER (H): ICD-10-CM

## 2024-08-29 RX ORDER — SILDENAFIL CITRATE 20 MG/1
20 TABLET ORAL DAILY PRN
Qty: 30 TABLET | Refills: 11 | Status: SHIPPED | OUTPATIENT
Start: 2024-08-29

## 2024-08-29 NOTE — TELEPHONE ENCOUNTER
[facility] Call Center    Phone Message    May a detailed message be left on voicemail: no     Reason for Call: Medication Refill Request    Has the patient contacted the pharmacy for the refill? Yes   Name of medication being requested: sildenafil (REVATIO) 20 MG tablet   Provider who prescribed the medication: Dr Faith  Pharmacy: [facility]  Date medication is needed: Soon as possible       Action Taken: Other: [facility]    Travel Screening: Not Applicable

## 2024-11-15 RX ORDER — SILDENAFIL CITRATE 20 MG/1
20 TABLET ORAL DAILY PRN
Qty: 30 TABLET | Refills: 0 | OUTPATIENT
Start: 2024-11-15

## 2025-01-02 ENCOUNTER — TRANSFERRED RECORDS (OUTPATIENT)
Dept: HEALTH INFORMATION MANAGEMENT | Facility: CLINIC | Age: 75
End: 2025-01-02

## 2025-02-17 ENCOUNTER — TELEPHONE (OUTPATIENT)
Dept: UROLOGY | Facility: CLINIC | Age: 75
End: 2025-02-17
Payer: MEDICARE

## 2025-03-04 ENCOUNTER — APPOINTMENT (OUTPATIENT)
Dept: URBAN - METROPOLITAN AREA CLINIC 256 | Age: 75
Setting detail: DERMATOLOGY
End: 2025-03-04

## 2025-03-04 VITALS — HEIGHT: 71 IN | WEIGHT: 225 LBS

## 2025-03-04 DIAGNOSIS — D18.0 HEMANGIOMA: ICD-10-CM

## 2025-03-04 DIAGNOSIS — B35.1 TINEA UNGUIUM: ICD-10-CM

## 2025-03-04 DIAGNOSIS — Z71.89 OTHER SPECIFIED COUNSELING: ICD-10-CM

## 2025-03-04 DIAGNOSIS — L91.8 OTHER HYPERTROPHIC DISORDERS OF THE SKIN: ICD-10-CM

## 2025-03-04 DIAGNOSIS — L57.8 OTHER SKIN CHANGES DUE TO CHRONIC EXPOSURE TO NONIONIZING RADIATION: ICD-10-CM

## 2025-03-04 DIAGNOSIS — L82.0 INFLAMED SEBORRHEIC KERATOSIS: ICD-10-CM

## 2025-03-04 DIAGNOSIS — D485 NEOPLASM OF UNCERTAIN BEHAVIOR OF SKIN: ICD-10-CM

## 2025-03-04 DIAGNOSIS — D22 MELANOCYTIC NEVI: ICD-10-CM

## 2025-03-04 DIAGNOSIS — L82.1 OTHER SEBORRHEIC KERATOSIS: ICD-10-CM

## 2025-03-04 PROBLEM — D22.5 MELANOCYTIC NEVI OF TRUNK: Status: ACTIVE | Noted: 2025-03-04

## 2025-03-04 PROBLEM — D22.71 MELANOCYTIC NEVI OF RIGHT LOWER LIMB, INCLUDING HIP: Status: ACTIVE | Noted: 2025-03-04

## 2025-03-04 PROBLEM — D22.61 MELANOCYTIC NEVI OF RIGHT UPPER LIMB, INCLUDING SHOULDER: Status: ACTIVE | Noted: 2025-03-04

## 2025-03-04 PROBLEM — D22.72 MELANOCYTIC NEVI OF LEFT LOWER LIMB, INCLUDING HIP: Status: ACTIVE | Noted: 2025-03-04

## 2025-03-04 PROBLEM — D22.62 MELANOCYTIC NEVI OF LEFT UPPER LIMB, INCLUDING SHOULDER: Status: ACTIVE | Noted: 2025-03-04

## 2025-03-04 PROBLEM — D22.39 MELANOCYTIC NEVI OF OTHER PARTS OF FACE: Status: ACTIVE | Noted: 2025-03-04

## 2025-03-04 PROBLEM — D48.5 NEOPLASM OF UNCERTAIN BEHAVIOR OF SKIN: Status: ACTIVE | Noted: 2025-03-04

## 2025-03-04 PROBLEM — D18.01 HEMANGIOMA OF SKIN AND SUBCUTANEOUS TISSUE: Status: ACTIVE | Noted: 2025-03-04

## 2025-03-04 PROCEDURE — 11102 TANGNTL BX SKIN SINGLE LES: CPT | Mod: 59

## 2025-03-04 PROCEDURE — 99213 OFFICE O/P EST LOW 20 MIN: CPT | Mod: 25

## 2025-03-04 PROCEDURE — OTHER COUNSELING: OTHER

## 2025-03-04 PROCEDURE — 11200 RMVL SKIN TAGS UP TO&INC 15: CPT | Mod: 59

## 2025-03-04 PROCEDURE — OTHER ADDITIONAL NOTES: OTHER

## 2025-03-04 PROCEDURE — OTHER LIQUID NITROGEN: OTHER

## 2025-03-04 PROCEDURE — 17110 DESTRUCT B9 LESION 1-14: CPT

## 2025-03-04 PROCEDURE — OTHER MIPS QUALITY: OTHER

## 2025-03-04 PROCEDURE — OTHER SKIN TAG REMOVAL: OTHER

## 2025-03-04 PROCEDURE — OTHER BIOPSY BY SHAVE METHOD: OTHER

## 2025-03-04 ASSESSMENT — LOCATION DETAILED DESCRIPTION DERM
LOCATION DETAILED: RIGHT INFERIOR LATERAL NECK
LOCATION DETAILED: RIGHT VENTRAL DISTAL FOREARM
LOCATION DETAILED: RIGHT LATERAL TEMPLE
LOCATION DETAILED: LEFT ANTERIOR PROXIMAL THIGH
LOCATION DETAILED: LEFT INFERIOR MEDIAL MIDBACK
LOCATION DETAILED: LEFT SUPERIOR LATERAL MIDBACK
LOCATION DETAILED: LEFT FOREHEAD
LOCATION DETAILED: LEFT CLAVICULAR NECK
LOCATION DETAILED: LEFT LATERAL SUPERIOR EYELID
LOCATION DETAILED: LEFT VENTRAL PROXIMAL FOREARM
LOCATION DETAILED: LEFT DISTAL POSTERIOR UPPER ARM
LOCATION DETAILED: RIGHT CLAVICULAR NECK
LOCATION DETAILED: RIGHT ANTERIOR DISTAL THIGH
LOCATION DETAILED: RIGHT DISTAL POSTERIOR THIGH
LOCATION DETAILED: LEFT INFERIOR CENTRAL MALAR CHEEK
LOCATION DETAILED: LEFT LATERAL ABDOMEN
LOCATION DETAILED: RIGHT LOWER CUTANEOUS LIP
LOCATION DETAILED: RIGHT DISTAL POSTERIOR UPPER ARM
LOCATION DETAILED: LEFT DISTAL POSTERIOR THIGH

## 2025-03-04 ASSESSMENT — LOCATION ZONE DERM
LOCATION ZONE: LIP
LOCATION ZONE: NECK
LOCATION ZONE: LEG
LOCATION ZONE: EYELID
LOCATION ZONE: FACE
LOCATION ZONE: ARM
LOCATION ZONE: TRUNK

## 2025-03-04 ASSESSMENT — LOCATION SIMPLE DESCRIPTION DERM
LOCATION SIMPLE: LEFT FOREARM
LOCATION SIMPLE: LEFT FOREHEAD
LOCATION SIMPLE: ABDOMEN
LOCATION SIMPLE: RIGHT ANTERIOR NECK
LOCATION SIMPLE: LEFT CHEEK
LOCATION SIMPLE: LEFT POSTERIOR THIGH
LOCATION SIMPLE: LEFT SUPERIOR EYELID
LOCATION SIMPLE: LEFT THIGH
LOCATION SIMPLE: RIGHT LIP
LOCATION SIMPLE: RIGHT FOREARM
LOCATION SIMPLE: RIGHT UPPER ARM
LOCATION SIMPLE: RIGHT TEMPLE
LOCATION SIMPLE: LEFT LOWER BACK
LOCATION SIMPLE: RIGHT THIGH
LOCATION SIMPLE: LEFT UPPER ARM
LOCATION SIMPLE: RIGHT POSTERIOR THIGH
LOCATION SIMPLE: LEFT ANTERIOR NECK

## 2025-03-04 NOTE — PROCEDURE: ADDITIONAL NOTES
Detail Level: Simple
Render Risk Assessment In Note?: no
Additional Notes: - Recommended patient to use OTC Lamisil

## 2025-03-04 NOTE — PROCEDURE: SKIN TAG REMOVAL
Consent: Written consent obtained and the risks of skin tag removal was reviewed with the patient including but not limited to bleeding, pigmentary change, infection, pain, and remote possibility of scarring.
Add Associated Diagnoses If Applicable When Selecting Medical Necessity: Yes
Detail Level: Detailed
Include Z78.9 (Other Specified Conditions Influencing Health Status) As An Associated Diagnosis?: No
Anesthesia Type: 1% lidocaine with epinephrine
Medical Necessity Information: It is in your best interest to select a reason for this procedure from the list below. All of these items fulfill various CMS LCD requirements except the new and changing color options.
Medical Necessity Clause: This procedure was medically necessary because the lesions that were treated were:
Anesthesia Volume In Cc: 3

## 2025-03-04 NOTE — PROCEDURE: LIQUID NITROGEN
Add 52 Modifier (Optional): no
Show Applicator Variable?: Yes
Consent: The patient's verbal consent was obtained including but not limited to risks of crusting, scabbing, blistering, scarring, darker or lighter pigmentary change, recurrence, incomplete removal and infection.
Medical Necessity Information: It is in your best interest to select a reason for this procedure from the list below. All of these items fulfill various CMS LCD requirements except the new and changing color options.
Spray Paint Text: The liquid nitrogen was applied to the skin utilizing a spray paint frosting technique.
Medical Necessity Clause: This procedure was medically necessary because the lesions that were treated were:
Duration Of Freeze Thaw-Cycle (Seconds): 5-10
Number Of Freeze-Thaw Cycles: 1 freeze-thaw cycle
Post-Care Instructions: I reviewed with the patient in detail post-care instructions. Patient is to wear sunprotection, and avoid picking at any of the treated lesions. Pt may apply Vaseline to crusted or scabbing areas.
Detail Level: Detailed

## 2025-03-04 NOTE — PROCEDURE: BIOPSY BY SHAVE METHOD
Hide Anesthesia Volume?: No
Anesthesia Volume In Cc: 0.5
Billing Type: Third-Party Bill
X Size Of Lesion In Cm: 0
Electrodesiccation And Curettage Text: The wound bed was treated with electrodesiccation and curettage after the biopsy was performed.
Biopsy Type: H and E
Path Notes (To The Dermatopathologist): Please Check Margins
Notification Instructions: Patient will be notified of biopsy results. However, patient instructed to call the office if not contacted within 2 weeks.
Type Of Destruction Used: Curettage
Cryotherapy Text: The wound bed was treated with cryotherapy after the biopsy was performed.
Biopsy Method: double edge Personna blade
Detail Level: Detailed
Was A Bandage Applied: Yes
Post-Care Instructions: I reviewed with the patient in detail post-care instructions. Patient is to keep the biopsy site dry overnight, and then apply bacitracin twice daily until healed. Patient may apply hydrogen peroxide soaks to remove any crusting.
Consent: Written consent was obtained and risks were reviewed including but not limited to scarring, infection, bleeding, scabbing, incomplete removal, nerve damage and allergy to anesthesia.
Silver Nitrate Text: The wound bed was treated with silver nitrate after the biopsy was performed.
Anesthesia Type: 1% lidocaine with epinephrine
Electrodesiccation Text: The wound bed was treated with electrodesiccation after the biopsy was performed.
Dressing: bandage
Medical Necessity Information: It is in your best interest to select a reason for this procedure from the list below. All of these items fulfill various CMS LCD requirements except the new and changing color options.
Hemostasis: Drysol
Lab: -[id number]
Depth Of Biopsy: dermis
Curettage Text: The wound bed was treated with curettage after the biopsy was performed.
Wound Care: Petrolatum
Information: Selecting Yes will display possible errors in your note based on the variables you have selected. This validation is only offered as a suggestion for you. PLEASE NOTE THAT THE VALIDATION TEXT WILL BE REMOVED WHEN YOU FINALIZE YOUR NOTE. IF YOU WANT TO FAX A PRELIMINARY NOTE YOU WILL NEED TO TOGGLE THIS TO 'NO' IF YOU DO NOT WANT IT IN YOUR FAXED NOTE.

## 2025-06-14 ENCOUNTER — HEALTH MAINTENANCE LETTER (OUTPATIENT)
Age: 75
End: 2025-06-14

## 2025-08-21 ENCOUNTER — OFFICE VISIT (OUTPATIENT)
Dept: UROLOGY | Facility: CLINIC | Age: 75
End: 2025-08-21
Payer: MEDICARE

## 2025-08-21 VITALS
WEIGHT: 226 LBS | BODY MASS INDEX: 30.61 KG/M2 | HEART RATE: 74 BPM | HEIGHT: 72 IN | SYSTOLIC BLOOD PRESSURE: 147 MMHG | OXYGEN SATURATION: 96 % | DIASTOLIC BLOOD PRESSURE: 87 MMHG

## 2025-08-21 DIAGNOSIS — C61 PROSTATE CANCER (H): Primary | ICD-10-CM

## 2025-08-21 LAB — PSA SERPL-MCNC: <0.04 UG/L (ref 0–4)

## 2025-08-21 ASSESSMENT — PAIN SCALES - GENERAL: PAINLEVEL_OUTOF10: NO PAIN (0)

## (undated) DEVICE — SU SILK 2-0 FSL 18" 677G

## (undated) DEVICE — DAVINCI HOT SHEARS TIP COVER  400180

## (undated) DEVICE — SPONGE RAY-TEC 4X8" 7318

## (undated) DEVICE — PACK DAVINCI UROLOGY SBA15UDFSG

## (undated) DEVICE — DAVINCI XI ESU FCP BIPOLAR MARYLAND 470172

## (undated) DEVICE — ESU ENDO SCISSORS 5MM CVD 5DCS

## (undated) DEVICE — CATH HOLDER STRAP 36600

## (undated) DEVICE — PROTECTOR ARM ONE-STEP TRENDELENBURG 40418

## (undated) DEVICE — SU WND CLOSURE VLOC 90 ABS 3-0 VIOLET 6" CV-23 VLOCM0804

## (undated) DEVICE — SU VICRYL 0 CT-1 27" J340H

## (undated) DEVICE — CLIP ENDO HEMO-LOC PURPLE LG 544240

## (undated) DEVICE — DAVINCI XI DRAPE ARM 470015

## (undated) DEVICE — DAVINCI XI DRAPE COLUMN 470341

## (undated) DEVICE — TROCAR AIRSEAL BLADELESS 12X120MM IAS12-120

## (undated) DEVICE — SU MONOCRYL 4-0 PS-2 18" UND Y496G

## (undated) DEVICE — MANIFOLD NEPTUNE 4 PORT 700-20

## (undated) DEVICE — TUBING CONMED AIRSEAL SMOKE EVAC INSUFFLATION ASM-EVAC

## (undated) DEVICE — SU VICRYL 0 UR-6 27" J603H

## (undated) DEVICE — DRAIN JACKSON PRATT RESERVOIR 100ML SU130-1305

## (undated) DEVICE — GOWN XXL 9575

## (undated) DEVICE — KIT PATIENT POSITIONING PIGAZZI LATEX FREE 40580

## (undated) DEVICE — LINEN TOWEL PACK X5 5464

## (undated) DEVICE — CATH FOLEY 18FR 5ML LATEX 0165SI18

## (undated) DEVICE — ESU GROUND PAD UNIVERSAL W/O CORD

## (undated) DEVICE — GLOVE PROTEXIS BLUE W/NEU-THERA 6.5  2D73EB65

## (undated) DEVICE — DAVINCI XI GRASPER ENDOWRIST PROGRASP 470093

## (undated) DEVICE — SU WND CLOSURE V-LOC 3-0 CV-23 6" VLOCM1904

## (undated) DEVICE — SOL NACL 0.9% INJ 1000ML BAG 2B1324X

## (undated) DEVICE — DRAIN JACKSON PRATT CHANNEL 19FR ROUND HUBLESS SIL JP-2230

## (undated) DEVICE — DAVINCI XI SEAL UNIVERSAL 5-8MM 470361

## (undated) DEVICE — SU VICRYL 4-0 RB-1 27" J304

## (undated) DEVICE — DAVINCI XI NDL DRIVER LARGE 470006

## (undated) DEVICE — SYR 10ML FINGER CONTROL W/O NDL 309695

## (undated) DEVICE — GLOVE PROTEXIS W/NEU-THERA 7.5  2D73TE75

## (undated) DEVICE — ENDO POUCH UNIV RETRIEVAL SYSTEM INZII 10MM CD001

## (undated) DEVICE — DAVINCI XI MONOPOLAR SCISSORS HOT SHEARS 8MM 470179

## (undated) DEVICE — SOL NACL 0.9% IRRIG 1000ML BOTTLE 2F7124

## (undated) DEVICE — SOL WATER IRRIG 1000ML BOTTLE 2F7114

## (undated) DEVICE — NDL INSUFFLATION 120MM VERRES 172015

## (undated) DEVICE — SUCTION IRR STRYKERFLOW II W/TIP 250-070-520

## (undated) RX ORDER — HEPARIN SODIUM 5000 [USP'U]/.5ML
INJECTION, SOLUTION INTRAVENOUS; SUBCUTANEOUS
Status: DISPENSED
Start: 2022-07-22

## (undated) RX ORDER — FENTANYL CITRATE 50 UG/ML
INJECTION, SOLUTION INTRAMUSCULAR; INTRAVENOUS
Status: DISPENSED
Start: 2022-07-22

## (undated) RX ORDER — NEOSTIGMINE METHYLSULFATE 1 MG/ML
VIAL (ML) INJECTION
Status: DISPENSED
Start: 2022-07-22

## (undated) RX ORDER — FENTANYL CITRATE 0.05 MG/ML
INJECTION, SOLUTION INTRAMUSCULAR; INTRAVENOUS
Status: DISPENSED
Start: 2022-07-22

## (undated) RX ORDER — PROPOFOL 10 MG/ML
INJECTION, EMULSION INTRAVENOUS
Status: DISPENSED
Start: 2022-07-22

## (undated) RX ORDER — DEXAMETHASONE SODIUM PHOSPHATE 4 MG/ML
INJECTION, SOLUTION INTRA-ARTICULAR; INTRALESIONAL; INTRAMUSCULAR; INTRAVENOUS; SOFT TISSUE
Status: DISPENSED
Start: 2022-07-22

## (undated) RX ORDER — CEFAZOLIN SODIUM/WATER 2 G/20 ML
SYRINGE (ML) INTRAVENOUS
Status: DISPENSED
Start: 2022-07-22

## (undated) RX ORDER — HYDROMORPHONE HYDROCHLORIDE 1 MG/ML
INJECTION, SOLUTION INTRAMUSCULAR; INTRAVENOUS; SUBCUTANEOUS
Status: DISPENSED
Start: 2022-07-22

## (undated) RX ORDER — CEFAZOLIN SODIUM 1 G/3ML
INJECTION, POWDER, FOR SOLUTION INTRAMUSCULAR; INTRAVENOUS
Status: DISPENSED
Start: 2022-07-22

## (undated) RX ORDER — HYDROMORPHONE HCL IN WATER/PF 6 MG/30 ML
PATIENT CONTROLLED ANALGESIA SYRINGE INTRAVENOUS
Status: DISPENSED
Start: 2022-07-22

## (undated) RX ORDER — ONDANSETRON 2 MG/ML
INJECTION INTRAMUSCULAR; INTRAVENOUS
Status: DISPENSED
Start: 2022-07-22

## (undated) RX ORDER — GLYCOPYRROLATE 0.2 MG/ML
INJECTION, SOLUTION INTRAMUSCULAR; INTRAVENOUS
Status: DISPENSED
Start: 2022-07-22

## (undated) RX ORDER — KETOROLAC TROMETHAMINE 15 MG/ML
INJECTION, SOLUTION INTRAMUSCULAR; INTRAVENOUS
Status: DISPENSED
Start: 2022-07-22

## (undated) RX ORDER — LIDOCAINE HYDROCHLORIDE 20 MG/ML
INJECTION, SOLUTION EPIDURAL; INFILTRATION; INTRACAUDAL; PERINEURAL
Status: DISPENSED
Start: 2022-07-22